# Patient Record
Sex: MALE | Race: WHITE | NOT HISPANIC OR LATINO | Employment: OTHER | URBAN - METROPOLITAN AREA
[De-identification: names, ages, dates, MRNs, and addresses within clinical notes are randomized per-mention and may not be internally consistent; named-entity substitution may affect disease eponyms.]

---

## 2017-01-04 ENCOUNTER — GENERIC CONVERSION - ENCOUNTER (OUTPATIENT)
Dept: OTHER | Facility: OTHER | Age: 77
End: 2017-01-04

## 2017-02-22 ENCOUNTER — ALLSCRIPTS OFFICE VISIT (OUTPATIENT)
Dept: OTHER | Facility: OTHER | Age: 77
End: 2017-02-22

## 2017-04-07 ENCOUNTER — GENERIC CONVERSION - ENCOUNTER (OUTPATIENT)
Dept: OTHER | Facility: OTHER | Age: 77
End: 2017-04-07

## 2017-05-18 ENCOUNTER — ALLSCRIPTS OFFICE VISIT (OUTPATIENT)
Dept: OTHER | Facility: OTHER | Age: 77
End: 2017-05-18

## 2017-05-18 DIAGNOSIS — M75.22 BICIPITAL TENDINITIS OF LEFT SHOULDER: ICD-10-CM

## 2017-05-19 ENCOUNTER — GENERIC CONVERSION - ENCOUNTER (OUTPATIENT)
Dept: OTHER | Facility: OTHER | Age: 77
End: 2017-05-19

## 2017-05-19 LAB
25(OH)D3 SERPL-MCNC: 31 NG/ML (ref 30–100)
A/G RATIO (HISTORICAL): 1.5 (CALC) (ref 1–2.5)
ALBUMIN SERPL BCP-MCNC: 4.3 G/DL (ref 3.6–5.1)
ALP SERPL-CCNC: 62 U/L (ref 40–115)
ALT SERPL W P-5'-P-CCNC: 10 U/L (ref 9–46)
AST SERPL W P-5'-P-CCNC: 22 U/L (ref 10–35)
BASOPHILS # BLD AUTO: 0.4 %
BASOPHILS # BLD AUTO: 30 CELLS/UL (ref 0–200)
BILIRUB SERPL-MCNC: 0.8 MG/DL (ref 0.2–1.2)
BILIRUB UR QL STRIP: NEGATIVE
BUN SERPL-MCNC: 22 MG/DL (ref 7–25)
BUN/CREA RATIO (HISTORICAL): ABNORMAL (CALC) (ref 6–22)
CALCIUM (ADJUSTED FOR ALBUMIN) (HISTORICAL): 9.4 MG/DL (CALC) (ref 8.6–10.2)
CALCIUM SERPL-MCNC: 9.3 MG/DL (ref 8.6–10.3)
CHLORIDE SERPL-SCNC: 103 MMOL/L (ref 98–110)
CHOLEST SERPL-MCNC: 160 MG/DL (ref 125–200)
CHOLEST/HDLC SERPL: 4 (CALC)
CO2 SERPL-SCNC: 26 MMOL/L (ref 20–31)
COLOR UR: ABNORMAL
COMMENT (HISTORICAL): CLEAR
CREAT SERPL-MCNC: 1.13 MG/DL (ref 0.7–1.18)
DEPRECATED RDW RBC AUTO: 14.6 % (ref 11–15)
EGFR AFRICAN AMERICAN (HISTORICAL): 73 ML/MIN/1.73M2
EGFR-AMERICAN CALC (HISTORICAL): 63 ML/MIN/1.73M2
EOSINOPHIL # BLD AUTO: 251 CELLS/UL (ref 15–500)
EOSINOPHIL # BLD AUTO: 3.3 %
FECAL OCCULT BLOOD DIAGNOSTIC (HISTORICAL): NEGATIVE
GAMMA GLOBULIN (HISTORICAL): 2.9 G/DL (CALC) (ref 1.9–3.7)
GLUCOSE (HISTORICAL): 103 MG/DL (ref 65–99)
GLUCOSE (HISTORICAL): NEGATIVE
HCT VFR BLD AUTO: 44.8 % (ref 38.5–50)
HDLC SERPL-MCNC: 40 MG/DL
HGB BLD-MCNC: 15.4 G/DL (ref 13.2–17.1)
KETONES UR STRIP-MCNC: ABNORMAL MG/DL
LDL CHOLESTEROL (HISTORICAL): 100 MG/DL (CALC)
LEUKOCYTE ESTERASE UR QL STRIP: NEGATIVE
LYMPHOCYTES # BLD AUTO: 1414 CELLS/UL (ref 850–3900)
LYMPHOCYTES # BLD AUTO: 18.6 %
MCH RBC QN AUTO: 30.8 PG (ref 27–33)
MCHC RBC AUTO-ENTMCNC: 34.4 G/DL (ref 32–36)
MCV RBC AUTO: 89.7 FL (ref 80–100)
MONOCYTES # BLD AUTO: 851 CELLS/UL (ref 200–950)
MONOCYTES (HISTORICAL): 11.2 %
NEUTROPHILS # BLD AUTO: 5054 CELLS/UL (ref 1500–7800)
NEUTROPHILS # BLD AUTO: 66.5 %
NITRITE UR QL STRIP: NEGATIVE
NON-HDL-CHOL (CHOL-HDL) (HISTORICAL): 120 MG/DL (CALC)
PH UR STRIP.AUTO: 6 [PH] (ref 5–8)
PLATELET # BLD AUTO: 221 THOUSAND/UL (ref 140–400)
PMV BLD AUTO: 8.7 FL (ref 7.5–12.5)
POTASSIUM SERPL-SCNC: 4.5 MMOL/L (ref 3.5–5.3)
PROT UR STRIP-MCNC: NEGATIVE MG/DL
RBC # BLD AUTO: 5 MILLION/UL (ref 4.2–5.8)
SODIUM SERPL-SCNC: 138 MMOL/L (ref 135–146)
SP GR UR STRIP.AUTO: 1.02 (ref 1–1.03)
TOTAL PROTEIN (HISTORICAL): 7.2 G/DL (ref 6.1–8.1)
TRIGL SERPL-MCNC: 102 MG/DL
TSH SERPL DL<=0.05 MIU/L-ACNC: 1.53 MIU/L (ref 0.4–4.5)
WBC # BLD AUTO: 7.6 THOUSAND/UL (ref 3.8–10.8)

## 2017-06-21 ENCOUNTER — ALLSCRIPTS OFFICE VISIT (OUTPATIENT)
Dept: OTHER | Facility: OTHER | Age: 77
End: 2017-06-21

## 2017-06-21 DIAGNOSIS — G20 PARKINSON'S DISEASE (HCC): ICD-10-CM

## 2017-07-12 ENCOUNTER — ALLSCRIPTS OFFICE VISIT (OUTPATIENT)
Dept: OTHER | Facility: OTHER | Age: 77
End: 2017-07-12

## 2017-07-12 ENCOUNTER — GENERIC CONVERSION - ENCOUNTER (OUTPATIENT)
Dept: OTHER | Facility: OTHER | Age: 77
End: 2017-07-12

## 2017-07-14 ENCOUNTER — GENERIC CONVERSION - ENCOUNTER (OUTPATIENT)
Dept: OTHER | Facility: OTHER | Age: 77
End: 2017-07-14

## 2017-08-10 ENCOUNTER — GENERIC CONVERSION - ENCOUNTER (OUTPATIENT)
Dept: OTHER | Facility: OTHER | Age: 77
End: 2017-08-10

## 2017-10-10 ENCOUNTER — GENERIC CONVERSION - ENCOUNTER (OUTPATIENT)
Dept: OTHER | Facility: OTHER | Age: 77
End: 2017-10-10

## 2017-10-13 ENCOUNTER — ALLSCRIPTS OFFICE VISIT (OUTPATIENT)
Dept: OTHER | Facility: OTHER | Age: 77
End: 2017-10-13

## 2017-10-13 LAB
BILIRUB UR QL STRIP: NORMAL
CLARITY UR: NORMAL
COLOR UR: YELLOW
GLUCOSE (HISTORICAL): NORMAL
HGB UR QL STRIP.AUTO: NORMAL
KETONES UR STRIP-MCNC: NORMAL MG/DL
LEUKOCYTE ESTERASE UR QL STRIP: NORMAL
NITRITE UR QL STRIP: NORMAL
PH UR STRIP.AUTO: 7 [PH]
PROT UR STRIP-MCNC: NORMAL MG/DL
SP GR UR STRIP.AUTO: 1.02
UROBILINOGEN UR QL STRIP.AUTO: 0.2

## 2017-10-17 ENCOUNTER — GENERIC CONVERSION - ENCOUNTER (OUTPATIENT)
Dept: OTHER | Facility: OTHER | Age: 77
End: 2017-10-17

## 2017-10-18 ENCOUNTER — ALLSCRIPTS OFFICE VISIT (OUTPATIENT)
Dept: OTHER | Facility: OTHER | Age: 77
End: 2017-10-18

## 2017-10-24 ENCOUNTER — ALLSCRIPTS OFFICE VISIT (OUTPATIENT)
Dept: OTHER | Facility: OTHER | Age: 77
End: 2017-10-24

## 2017-10-24 DIAGNOSIS — G20 PARKINSON'S DISEASE (HCC): ICD-10-CM

## 2017-11-01 NOTE — PROGRESS NOTES
Assessment  1  Parkinson's disease (332 0) (G20)    Plan  Parkinson's disease    · *1 - [facility]  *Consult for therapy to work on PD balance,  gait and freezing techniques  Status: Active  Requested for: 37VNI4040   Ordered; For: Parkinson's disease; Ordered By: Daniel Hurtado Performed:  Due: 05YBC0243  Care Summary provided  : Yes   · Follow-up visit in 4 Months Evaluation and Treatment  Follow-up with Adiel Weston or Carol   Status: Complete  Done: 94BEC9500   Ordered; For: Parkinson's disease; Ordered By: Daniel Hurtado Performed:  Due: 64JTJ8758; Last Updated By: Melvi Gómez; 10/24/2017 9:17:01 AM   · Follow-up Visit in 8 Months Evaluation and Treatment  Follow-up WITH Dr Kory Charles  Status:  Complete  Done: 94RHW2496   Ordered; For: Parkinson's disease; Ordered By: Daniel Hurtado Performed:  Due: 61JAF1733; Last Updated By: Melvi Gómez; 10/24/2017 9:17:21 AM    Discussion/Summary  Discussion Summary:   Patient overall feels he is doing well other than continued freezing episodes  His exam is stable  He remains active with exercises at home  He has improvement of his tremors with Sinemet however denies any change in his freezing  Will have him try and take Sinemet 2tabs (5am, 8am, 11am, 2pm, 5pm) consistently for a week to see if this helps  If he has side effects he can return to his current dosing  Once again also discussed the role of therapy  Made another referral and will have him go to see if there are techniques to help with his freezing  He is still driving and may consider getting a Divers Evaluation in the future  No memory complaints  He is not interested in any surgical options  the case and plan to Dr Kory Charles for review  Counseling Documentation With Imm: The patient, patient's family was counseled regarding instructions for management,-- prognosis,-- patient and family education,-- impressions,-- risks and benefits of treatment options   total time of encounter was 35 minutes-- and-- 19 minutes was spent counseling  Chief Complaint  Chief Complaint Free Text Note Form: Patient presents f/u Parkinson's disease  History of Present Illness  HPI: John Gann is a 68year old male  with prostate cancer on hormonal treatment and Parkinson's disease who presents for follow up  To review, symptoms began with right hand rest tremor and decrease in fine motor movements which progressed to include right lower extremity tremor and slowness in movements  He was diagnosed August 2010 and started on Sinemet 25/100 tid and Azilect  Previous trial of ropinirole ER was associated with side effects  Darleen Terry was not covered  his last visit he was overall doing well with some improvement of wearing off with increased Sinemet  He was having some freezing and was sent for PT  No medication changes were made  He remains on Sinemet 1 5tabs x4hdolq (5am, 8am, 11am, 2pm, 5pm) and Azilect 1mg daily  He will take 2tabs of Sinemet if he is busy and doing a lot of activity  He denies any clear improvement on the higher dose however so he does not take 2tabs everyday  is still freezing more often later in the day when he is tired  It is worse when he goes through doorways  He did not go to the therapy  He does still do exercises every day  He will stretch, ride the bike and walk on the treadmill  He does shuffle at times but denies any falls  He is able to perform all of his ADLs well  He dresses and showers without issues  He is still driving and denies any issues  His wife is always in the car with him and she feels that he is still driving well  He was recently found to elevated PSA and may need to start a new treatment  His memory is still very good  He is sleeping well  He has a good appetite and denies any issues with swallowing  He finds that his tremors improve when he takes his dose of medication  He denies any improvement of the freezing even on higher doses of Sinemet     his ROS, FH, Sh and social history  Review of Systems  Neurological ROS:   Constitutional: no fever, no chills, no recent weight gain, no recent weight loss, no complaints of feeling tired, no changes in appetite  HEENT:  no sinus problems, not feeling congested, no blurred vision, no dryness of the eyes, no eye pain, no hearing loss, no tinnitus, no mouth sores, no sore throat, no hoarseness, no dysphagia, no masses, no bleeding  Cardiovascular:  no chest pain or pressure, no palpitations present, the heart rate was not rapid or irregular, no swelling in the arms or legs, no poor circulation  Respiratory:  no unusual or persistant cough, no shortness of breath with or without exertion  Gastrointestinal:  no nausea, no vomiting, no diarrhea, no abdominal pain, no changes in bowel habits, no melena, no loss of bowel control  Genitourinary: feelings of urinary urgency  Musculoskeletal: arthralgias-- and-- myalgias  Integumentary  no masses, no rash, no skin lesions, no livedo reticularis  Psychiatric:  no anxiety, no depression, no mood swings, no psychiatric hospitalizations, no sleep problems  Endocrine loss of sexual ability or drive   Hematologic/Lymphatic:  no unusual bleeding, no tendency for easy bruising, no clotting skin or lumps  Neurological General: waking up at night  Neurological Mental Status:  no confusion, no mood swings, no alteration or loss of consciousness, no difficulty expressing/understanding speech, no memory problems  Neurological Cranial Nerves:  no blurry or double vision, no loss of vision, no face drooping, no facial numbness or weakness, no taste or smell loss/changes, no hearing loss or ringing, no vertigo or dizziness, no dysphagia, no slurred speech  Neurological Motor findings include:  no tremor, no twitching, no cramping(pre/post exercise), no atrophy  Neurological Coordination: clumsiness     Neurological Sensory:  no numbness, no pain, no tingling, does not fall when eyes closed or taking a shower  Neurological Gait:  no difficulty walking, not falling to one side, no sensation of being pushed, has not had falls  Active Problems  1  Arteriosclerotic coronary artery disease (414 00) (I25 10)   2  Benign hypertrophy of prostate (600 00) (N40 0)   3  Biceps tendinitis of left shoulder (726 12) (M75 22)   4  CA of prostate (185) (C61)   5  Chronic low back pain (724 2,338 29) (M54 5,G89 29)   6  Contusion With Intact Skin Surface Of The Right Middle Fingernail (923 3)   7  Depression (311) (F32 9)   8  Flu vaccine need (V04 81) (Z23)   9  Gastroenteritis (558 9) (K52 9)   10  GERD without esophagitis (530 81) (K21 9)   11  Hearing Loss (389 9)   12  Hematuria (599 70) (R31 9)   13  Hypercholesterolemia (272 0) (E78 00)   14  Hypertension (401 9) (I10)   15  Infection of nose (478 19) (J34 89)   16  Myocardial Infarction Arrhythmias (427 9)   17  Parkinson's disease (332 0) (G20)   18  Prostate cancer (185) (C61)   19  Prostatitis (601 9) (N41 9)   20  Unstable angina (411 1) (I20 0)    Surgical History  1  History of Needle Biopsy Of Prostate    Family History  Mother    1  No pertinent family history  Father    2  Family history of Prostate cancer  Brother    3  Family history of myocardial infarction (V17 3) (Z82 49)  Family History    4  Family history of Brother  At Age 43   8  Family history of Father  At Age 80    Social History   · Does not use illicit drugs (I86 62) (Z78 9)   · Never a smoker   · Never A Smoker   · Occasional alcohol use    Current Meds   1  Aspirin 81 MG CAPS; TAKE 2 CAPSULE Daily; Therapy: (Recorded:72Obe9418) to Recorded   2  Atorvastatin Calcium 20 MG Oral Tablet; Take 1 tablet daily; Therapy: (Recorded:06Edp8418) to Recorded   3  Bicalutamide 50 MG Oral Tablet; TAKE 1 TABLET DAILY; Last Rx:2017 Ordered   4  Carbidopa-Levodopa  MG Oral Tablet; TAKE 2 TABLETS BY MOUTH 3 TIMES A   DAY;    Therapy: 2011 to (Domingo Coffey)  Requested for: 73ABV9435; Last   TF:58HMB1637 Ordered   5  Clopidogrel Bisulfate 75 MG Oral Tablet; Take 1 tablet daily Recorded   6  Lupron Depot (6-Month) KIT; INJECT EVERY 6 MONTHS; Therapy: (Recorded:84Yda5742) to Recorded   7  Metoprolol Tartrate 25 MG Oral Tablet; TAKE 1 TABLET TWICE DAILY; Therapy: (9640 1055) to Recorded   8  Rasagiline Mesylate 1 MG Oral Tablet; Take 1 tablet daily; Therapy: 57WFC3836 to (Evaluate:91Chs2889)  Requested for: 60Bxu2351; Last   Rx:09Cbo8229 Ordered    Allergies  1  No Known Drug Allergies    Vitals  Signs   Recorded: 39IOA9103 08:30AM   Heart Rate: 77  Weight: 181 lb 4 oz  BMI Calculated: 25 28  BSA Calculated: 2 02  Recorded: 24Oct2017 08:29AM   Heart Rate: 76  Systolic: 070, LUE, Sitting  Diastolic: 79, LUE, Sitting  Height: 5 ft 11 in  Weight: 226 lb 7 oz  BMI Calculated: 31 58  BSA Calculated: 2 22    Physical Exam    Constitutional   General appearance: Abnormal  -- (Moderate hypomimia 2 , Mild to moderate hypophonia 2)   Musculoskeletal   Gait and station: Abnormal  -- (Arose without using hands on second attempt  Posture slightly stooped with right tilt  Good stride and arm swing  Wide based gait  Slight freezing on turn  Reemergent resting tremor on the right with ambulation, tendency to hold the right hand slightly turned when walking  )   Muscle strength: Normal strength throughout  Muscle tone: No atrophy, abnormal movements, flaccidity, cogwheeling or spasticity  -- (No rigidity noted  )   Involuntary movements: Abnormal involuntary movements were observed  -- (Finger to nose was normal  An intermittent mild amplitude rest tremor is present in the right upper extremity which disappears with action and reemerges 1,0  There is no action tremor  Mild bradykinesia right finger taps 1,0 , handgrips 1,0 rapid alternating movements 1,1  Normal heel and toe taps   There is no dystonia, or dyskinesia)   Neurologic   Orientation to person, place, and time: Normal     Recent and remote memory: Demonstrates normal memory  Attention span and concentration: Normal thought process and attention span  Language: Names objects, able to repeat phrases and speaks spontaneously  -- ( [address] [place] [address], Ne 27/30 6/10/15)-- MOCA 30/30 - 6/21/17  Fund of knowledge: Normal vocabulary with appropriate knowledge of current events and past history  2nd cranial nerve: Normal     3rd, 4th, and 6th cranial nerves: Normal     5th cranial nerve: Normal     7th cranial nerve: Normal     8th cranial nerve: Normal     9th cranial nerve: Normal     11th cranial nerve: Normal     12th cranial nerve: Normal     Reflexes: Normal  -- (Positive glabellar)   Coordination: Normal        Attending Note  Collaborating Physician Note: Collaborating Note: I agree with the Advanced Practitioner note   I discussed the case with the Advanced Practitioner and reviewed the AP note      Future Appointments    Date/Time Provider Specialty Site   11/30/2017 08:00 AM Irasema Chanel, [address] [place] [address]   01/17/2018 09:15 AM Manjinder Oneal MD Urology [address]   02/08/2018 08:00 AM Luba Olszewski, [facility] Neurology [facility]     Signatures   Electronically signed by : Gene Siddiqi [facility]; Oct 24 2017  9:13AM EST                       (Author)    Electronically signed by : Gina Silveira MD; Oct 31 2017  5:00AM EST                       (Author)

## 2017-11-07 ENCOUNTER — GENERIC CONVERSION - ENCOUNTER (OUTPATIENT)
Dept: OTHER | Facility: OTHER | Age: 77
End: 2017-11-07

## 2017-11-30 ENCOUNTER — ALLSCRIPTS OFFICE VISIT (OUTPATIENT)
Dept: OTHER | Facility: OTHER | Age: 77
End: 2017-11-30

## 2017-12-01 ENCOUNTER — GENERIC CONVERSION - ENCOUNTER (OUTPATIENT)
Dept: OTHER | Facility: OTHER | Age: 77
End: 2017-12-01

## 2017-12-01 ENCOUNTER — LAB CONVERSION - ENCOUNTER (OUTPATIENT)
Dept: OTHER | Facility: OTHER | Age: 77
End: 2017-12-01

## 2017-12-01 LAB
A/G RATIO (HISTORICAL): 1.6 (CALC) (ref 1–2.5)
ALBUMIN SERPL BCP-MCNC: 4.3 G/DL (ref 3.6–5.1)
ALP SERPL-CCNC: 68 U/L (ref 40–115)
ALT SERPL W P-5'-P-CCNC: 13 U/L (ref 9–46)
AST SERPL W P-5'-P-CCNC: 18 U/L (ref 10–35)
BILIRUB SERPL-MCNC: 0.8 MG/DL (ref 0.2–1.2)
BUN SERPL-MCNC: 19 MG/DL (ref 7–25)
BUN/CREA RATIO (HISTORICAL): ABNORMAL (CALC) (ref 6–22)
CALCIUM (ADJUSTED FOR ALBUMIN) (HISTORICAL): 9.7 MG/DL (CALC) (ref 8.6–10.2)
CALCIUM SERPL-MCNC: 9.6 MG/DL (ref 8.6–10.3)
CHLORIDE SERPL-SCNC: 103 MMOL/L (ref 98–110)
CHOLEST SERPL-MCNC: 127 MG/DL
CHOLEST/HDLC SERPL: 3.5 (CALC)
CO2 SERPL-SCNC: 26 MMOL/L (ref 20–31)
CREAT SERPL-MCNC: 1.04 MG/DL (ref 0.7–1.18)
EGFR AFRICAN AMERICAN (HISTORICAL): 80 ML/MIN/1.73M2
EGFR-AMERICAN CALC (HISTORICAL): 69 ML/MIN/1.73M2
GAMMA GLOBULIN (HISTORICAL): 2.7 G/DL (CALC) (ref 1.9–3.7)
GLUCOSE (HISTORICAL): 100 MG/DL (ref 65–99)
HDLC SERPL-MCNC: 36 MG/DL
LDL CHOLESTEROL (HISTORICAL): 75 MG/DL (CALC)
NON-HDL-CHOL (CHOL-HDL) (HISTORICAL): 91 MG/DL (CALC)
POTASSIUM SERPL-SCNC: 4.1 MMOL/L (ref 3.5–5.3)
SODIUM SERPL-SCNC: 139 MMOL/L (ref 135–146)
TOTAL PROTEIN (HISTORICAL): 7 G/DL (ref 6.1–8.1)
TRIGL SERPL-MCNC: 78 MG/DL

## 2017-12-05 NOTE — PROGRESS NOTES
Assessment    1  Arteriosclerotic coronary artery disease (414 00) (I25 10)   2  Hypercholesterolemia (272 0) (E78 00)   3  Hypertension (401 9) (I10)   4  GERD without esophagitis (530 81) (K21 9)   5  Biceps tendinitis of left shoulder (726 12) (M75 22)   6  Parkinson's disease (332 0) (G20)   7  Prostate cancer (80) (C61)    Plan  Arteriosclerotic coronary artery disease, Hypercholesterolemia, Hypertension    · (Q) COMPREHENSIVE METABOLIC PNL W/ADJUSTED CALCIUM; Status:Active; Requested for:30Nov2017;    · (Q) LIPID PANEL WITH REFLEX TO DIRECT LDL; Status:Active; Requested  for:30Nov2017;   Biceps tendinitis of left shoulder    · *1 -  [facility] )  Co-Management  *  Status: Active  Requested for: 53XAM4024  Care Summary provided  : Yes  Health Maintenance    · *VB - Fall Risk Assessment  (Dx Z13 89 Screen for Neurologic Disorder); Status:Active; Requested for:30Nov2017;    · *VB - Urinary Incontinence Screen (Dx Z13 89 Screen for UI); Status:Active; Requested  for:30Nov2017;   Hypercholesterolemia    · A diet that is low in fat, cholesterol, and sodium is considered a cardiac diet ;  Status:Complete;   Done: 98IPW3258   · Begin a limited exercise program ; Status:Complete;   Done: 96TXT6393   · Begin or continue regular aerobic exercise  Gradually work up to at least 3 sessions of  30 minutes of exercise a week ; Status:Complete;   Done: 87RNP9994   · Continue with our present treatment plan ; Status:Complete;   Done: 48CYD2577   · Eat a low fat and low cholesterol diet ; Status:Complete;   Done: 56HLZ6978   · Eat no more than 30 grams of fat per day ; Status:Complete;   Done: 67BHK2565   · We recommend you modify your diet to achieve and maintain a healthy weight  Being  overweight may increase your risk for developing health problems such as diabetes,  heart disease, and cancer  Avoid high fat foods and eat a balanced diet rich  in fruits and vegetables   The combination of a reduced-calorie diet and increased  physical activity is recommended  Please let us know if you would like to  learn more about your nutrition and calorie needs, and additional options including  weight loss programs that can help you achieve your goals ; Status:Complete;   Done:  62EZG9484  Hypertension    · Call [phone] if: Your blood pressure is frequently higher than 140/90 ;  Status:Complete;   Done: 06PKU3302   · Restrict the salt in your diet by avoiding highly salted foods ; Status:Complete;   Done:  31CRX5652   · Restrict your sodium (salt) intake to 2 grams per day ; Status:Complete;   Done:  56IRV4280   · Take your blood pressure twice a week  Record the numbers and bring them with you to  your appointments ; Status:Complete;   Done: 66HOS6613   · We encourage you to begin to make lifestyle changes to help control your blood  pressure  These may include losing weight, increasing your activity level, limiting salt in  your diet, decreasing alcohol intake, and eating a diet low in fat and rich in fruits  and vegetables ; Status:Complete;   Done: 96SZT6244    Discussion/Summary    Fasting labs drawn for CMP and lipid profile  Patient is being referred to [address] [place] [address] for further evaluation and treatment of left shoulder pain  Patient to continue present treatment  Patient instructed to follow a low-fat and a low-salt diet and continue regular exercise 150 minutes per week  Patient to follow up with the specialist as scheduled and return to the office in 6 months  Possible side effects of new medications were reviewed with the patient/guardian today  The treatment plan was reviewed with the patient/guardian  The patient/guardian understands and agrees with the treatment plan      Chief Complaint  Fasting  quest   Patient is here today for follow up of chronic conditions described in HPI        History of Present Illness  Patient is here for routine appointment for chronic conditions and fasting labs  Patient has been feeling well overall although complains of continued left shoulder pain  Patient has been exercising on his treadmill and stationary bike for 20-25 minutes 7 days a week  The patient states he has been doing well with his coronary artery disease symptoms since the last visit  Comorbid Illnesses: hypertension  Complications: MI  He has no significant interval events  Symptoms: denies chest pain when at rest, denies exertional chest pain, denies dyspnea, stable fatigue and stable exercise intolerance  Associated symptoms include no syncope, no palpitations, no PND, no orthopnea and no edema  His symptoms do not limit his activities  He denies nitroglycerin use since the last visit  Medications: the patient is adherent with his medication regimen  He denies medication side effects  The patient is being seen for follow-up of gastroesophageal reflux disease and occas TUMS  The patient reports doing well  He has had no significant interval events  Interval symptoms:  stable heartburn, denies chest pain, denies abdominal pain, denies acid regurgitation and denies dysphagia  Associated symptoms: no hoarseness, no cough, no nausea, no vomiting, no hematemesis, no melena and no weight loss  The patient is not currently on medication for this problem  Disease management:  the patient is doing well with his goals  The patient states his hyperlipidemia has been under good control since the last visit  Comorbid Illnesses: coronary artery disease and hypertension  He has no significant interval events  Symptoms: denies muscle pain and denies muscle weakness  Associated symptoms include no memory loss  Medications: the patient is adherent with his medication regimen  He denies medication side effects  The patient is doing well with his hyperlipidemia goals  the patient's last LDL was 100 mg/dL   The patient is due for a lipid panel and liver function tests    The patient presents for follow-up of essential hypertension  The patient states he has been stable with his blood pressure control since the last visit  Comorbid Illnesses: coronary artery disease  He has no significant interval events  Symptoms: denies impaired vision, denies dyspnea, denies chest pain, denies intermittent leg claudication and denies lower extremity edema  Associated symptoms include no headache  Home monitoring: The patient is not checking blood pressure at home  Medications: the patient is adherent with his medication regimen  He denies medication side effects  Disease Management: the patient is doing well with his blood pressure goals  Review of Systems    Constitutional: no fever, not feeling poorly and no chills    The patient presents with complaints of occasional episodes of feeling tired  Eyes: No complaints of eye pain, no red eyes, no discharge from eyes, no itchy eyes  ENT: no complaints of earache, no hearing loss, no nosebleeds, no nasal discharge, no sore throat, no hoarseness  Genitourinary: nocturia and 3x, but no dysuria, no urinary hesitancy and no incontinence  Hematologic/Lymphatic: No complaints of swollen glands, no swollen glands in the neck, does not bleed easily, no easy bruising  Preventive Quality 65 and Older: Falls Risk: The patient fell 0 times in the past 12 months  Symptoms Include: impaired balance, but no confusion, no lightheadedness, no vertigo, no dizziness, no syncope, no visual problems and no leg weakness  The patient is currently experiencing fall symptoms  Associated symptoms:  impaired mobility, but no impaired ability to live independently  The patient is currently experiencing urinary symptoms   Urinary Incontinence Symptoms includes: urinary urgency, nocturia and post-void dribbling, but no urinary incontinence, no incomplete bladder emptying, no urinary frequency, no urinary hesitancy, no dysuria, no straining, no weak stream and no intermittent stream       Active Problems    1  Arteriosclerotic coronary artery disease (414 00) (I25 10)   2  Benign hypertrophy of prostate (600 00) (N40 0)   3  Biceps tendinitis of left shoulder (726 12) (M75 22)   4  CA of prostate (185) (C61)   5  Chronic low back pain (724 2,338 29) (M54 5,G89 29)   6  Contusion With Intact Skin Surface Of The Right Middle Fingernail (923 3)   7  Depression (311) (F32 9)   8  Flu vaccine need (V04 81) (Z23)   9  Gastroenteritis (558 9) (K52 9)   10  GERD without esophagitis (530 81) (K21 9)   11  Hearing Loss (389 9)   12  Hematuria (599 70) (R31 9)   13  Hypercholesterolemia (272 0) (E78 00)   14  Hypertension (401 9) (I10)   15  Myocardial Infarction Arrhythmias (427 9)   16  Parkinson's disease (332 0) (G20)   17  Prostate cancer (185) (C61)   18  Prostatitis (601 9) (N41 9)   19  Unstable angina (411 1) (I20 0)    Surgical History    1  History of Needle Biopsy Of Prostate    Family History  Mother    1  No pertinent family history  Father    2  Family history of Prostate cancer  Brother    3  Family history of myocardial infarction (V17 3) (Z82 49)  Family History    4  Family history of Brother  At Age 43   8  Family history of Father  At Age 80    Social History    · Does not use illicit drugs (R15 97) (Z78 9)   · Never a smoker   · Never A Smoker   · Occasional alcohol use    Current Meds   1  Aspirin 81 MG CAPS; TAKE 2 CAPSULE Daily; Therapy: (Recorded:53Wxu1156) to Recorded   2  Atorvastatin Calcium 20 MG Oral Tablet; Take 1 tablet daily; Therapy: (Recorded:75Hvb5701) to Recorded   3  Carbidopa-Levodopa  MG Oral Tablet; TAKE 2 TABLETS BY MOUTH 3 TIMES A   DAY; Therapy: 16HFX3679 to (Manjinder Canela)  Requested for: 18GFV9707; Last   Rx:2017 Ordered   4  Clopidogrel Bisulfate 75 MG Oral Tablet; Take 1 tablet daily Recorded   5  Lupron Depot (6-Month) KIT; INJECT EVERY 6 MONTHS;    Therapy: (Recorded:95Pes2540) to Recorded   6  Metoprolol Tartrate 25 MG Oral Tablet; TAKE 1 TABLET TWICE DAILY; Therapy: (Haig Fill) to Recorded   7  PredniSONE 2 5 MG Oral Tablet; Take 1 tablet twice daily; Therapy: (Recorded:30Nov2017) to Recorded   8  Rasagiline Mesylate 1 MG Oral Tablet; Take 1 tablet daily; Therapy: 00ETE7281 to (Evaluate:54Zca1957)  Requested for: 69Zfv7316; Last   Rx:55Zwg8111 Ordered   9  Zytiga 500 MG Oral Tablet; Take 1 tablet daily; Therapy: (Recorded:30Nov2017) to Recorded    Allergies    1  No Known Drug Allergies    Vitals  Vital Signs    Recorded: 51RKC3208 08:30AM Recorded: 37VNA2359 08:04AM   Temperature  97 7 F   Heart Rate 64    Respiration 16    Systolic 054    Diastolic 84    BP CUFF SIZE Large    Height  5 ft 11 in   Weight  227 lb    BMI Calculated  31 66   BSA Calculated  2 23     Physical Exam    Constitutional   General appearance: No acute distress, well appearing and well nourished  Eyes   Conjunctiva and lids: No swelling, erythema, or discharge  Ears, Nose, Mouth, and Throat   External inspection of ears and nose: Normal     Otoscopic examination: Tympanic membrance translucent with normal light reflex  Canals patent without erythema  Nasal mucosa, septum, and turbinates: Normal without edema or erythema  Oropharynx: Normal with no erythema, edema, exudate or lesions  Pulmonary   Respiratory effort: No increased work of breathing or signs of respiratory distress  Auscultation of lungs: Clear to auscultation, equal breath sounds bilaterally, no wheezes, no rales, no rhonci  Cardiovascular   Auscultation of heart: Normal rate and rhythm, normal S1 and S2, without murmurs  Examination of extremities for edema and/or varicosities: Normal     Carotid pulses: Normal     Abdomen   Abdomen: Non-tender, no masses  Lymphatic   Palpation of lymph nodes in neck: No lymphadenopathy      Musculoskeletal   Gait and station: Abnormal     Inspection/palpation of joints, bones, and muscles: Abnormal   Left shoulder range of motion slightly decreased  Positive tenderness over anterior lateral aspect  Skin   Skin and subcutaneous tissue: Normal without rashes or lesions      Psychiatric   Orientation to person, place and time: Normal     Mood and affect: Normal          Future Appointments    Date/Time Provider Specialty Site   01/17/2018 09:15 AM Brad Randall MD Urology [address]   02/08/2018 08:00 AM Arnav Garcia [facility] [address] [facility]     Signatures   Electronically signed by : Abbie Man DO; Nov 30 2017  8:43AM EST                       (Author)

## 2017-12-11 ENCOUNTER — APPOINTMENT (OUTPATIENT)
Dept: RADIOLOGY | Facility: OTHER | Age: 77
End: 2017-12-11
Payer: COMMERCIAL

## 2017-12-11 ENCOUNTER — ALLSCRIPTS OFFICE VISIT (OUTPATIENT)
Dept: OTHER | Facility: OTHER | Age: 77
End: 2017-12-11

## 2017-12-11 DIAGNOSIS — M25.512 PAIN IN LEFT SHOULDER: ICD-10-CM

## 2017-12-11 DIAGNOSIS — M75.32 CALCIFIC TENDINITIS OF LEFT SHOULDER: ICD-10-CM

## 2017-12-11 DIAGNOSIS — M75.22 BICIPITAL TENDINITIS OF LEFT SHOULDER: ICD-10-CM

## 2017-12-11 PROCEDURE — 73030 X-RAY EXAM OF SHOULDER: CPT

## 2017-12-11 PROCEDURE — 73060 X-RAY EXAM OF HUMERUS: CPT

## 2017-12-12 ENCOUNTER — GENERIC CONVERSION - ENCOUNTER (OUTPATIENT)
Dept: FAMILY MEDICINE CLINIC | Facility: CLINIC | Age: 77
End: 2017-12-12

## 2017-12-12 NOTE — PROGRESS NOTES
Assessment    1  Calcific tendinitis of left shoulder (726 11) (M75 32)   2  Biceps tendinitis of left shoulder (726 12) (M75 22)   3  Left shoulder pain (719 41) (M25 512)    Plan  Biceps tendinitis of left shoulder, Calcific tendinitis of left shoulder, Left shoulder pain    · *1 - [facility]  *  Status: Active  Requested for: 10DKY2723  Care Summary provided  : Yes  Left shoulder pain    · * XR HUMERUS LEFT; Status:Active - Retrospective Authorization; Requestedfor:83Fur8847;    · * XR SHOULDER 2+ VIEW LEFT; Status:Active - Retrospective By Protocol Authorization; Requested for:17Dgp0982;     Discussion/Summary  Patient discussion: discussed with the patient, 45 minute visit, greater than half of the time was spent on counseling  Had a detailed discussion with Mr Mylene Herrmann with regards to his left shoulder and arm pain  His likely getting a referred pain to his left arm and indicated good relief of his left shoulder and arm pain with improved range of motion following left subacromial cortisone injection on today's visit  I will also advised him to do a course of physical therapy rehabilitation and follow him up in about 2 months time for clinical reassessment in this regard  Chief Complaint    1  Arm Pain  Left shoulder/arm pain      History of Present Illness  Mr Mylene Herrmann is a pleasant 49-year-old right-hand-dominant man who is here today for history of left anterior arm pain and some shoulder pain for approximately 9 months duration  Denies any history of trauma prior to the onset of his symptoms  Describes this as an aching pain mostly located on the left anterior arm radiating down to his elbow  Made worse with left shoulder abduction as well as internal rotation  Denies any significant associated neck pain  Denies any new tinea or numbness of the left upper extremity  is significant for prostatic cancer with bony metastasis as well as a history of Parkinson's disease   His currently on chemotherapy and reports that he has received a left shoulder injection in the past for the pain but this did not provide him significant relief  Review of Systems   Constitutional: No fever or chills, feels well, no tiredness, no recent weight loss or weight gain  Eyes: No complaints of red eyes, no eyesight problems  ENT: no complaints of loss of hearing, no nosebleeds, no sore throat  Cardiovascular: No complaints of chest pain, no palpitations, no leg claudication or lower extremity edema  Respiratory: No complaints of shortness of breath, no wheezing, no cough  Gastrointestinal: No complaints of abdominal pain, no constipation, no nausea or vomiting, no diarrhea or bloody stools  Genitourinary: No complaints of dysuria or incontinence, no hesitancy, no nocturia  Musculoskeletal: as noted in HPI  Integumentary: No complaints of skin rash or lesion, no itching or dry skin, no skin wounds  Neurological: No complaints of headache, no confusion, no numbness or tingling, no dizziness  Psychiatric: No suicidal thoughts, no anxiety, no depression  Endocrine: frequent urination  Active Problems  1  Arteriosclerotic coronary artery disease (414 00) (I25 10)   2  Benign hypertrophy of prostate (600 00) (N40 0)   3  Biceps tendinitis of left shoulder (726 12) (M75 22)   4  CA of prostate (185) (C61)   5  Calcific tendinitis of left shoulder (726 11) (M75 32)   6  Chronic low back pain (724 2,338 29) (M54 5,G89 29)   7  Contusion With Intact Skin Surface Of The Right Middle Fingernail (923 3)   8  Depression (311) (F32 9)   9  Flu vaccine need (V04 81) (Z23)   10  Gastroenteritis (558 9) (K52 9)   11  GERD without esophagitis (530 81) (K21 9)   12  Hearing Loss (389 9)   13  Hematuria (599 70) (R31 9)   14  Hypercholesterolemia (272 0) (E78 00)   15  Hypertension (401 9) (I10)   16  Left shoulder pain (719 41) (M25 512)   17  Myocardial Infarction Arrhythmias (427 9)   18   Parkinson's disease (332 0) (Debi Flow)   19  Prostate cancer (185) (C61)   20  Prostatitis (601 9) (N41 9)   21  Unstable angina (411 1) (I20 0)    Past Medical History    The active problems and past medical history were reviewed and updated today  Surgical History   · History of Needle Biopsy Of Prostate    The surgical history was reviewed and updated today  Family History  Mother    · No pertinent family history  Father    · Family history of Prostate cancer  Brother    · Family history of myocardial infarction (V17 3) (Z82 49)  Family History    · Family history of Brother  At Age 39   · Family history of Father  At Age 80    The family history was reviewed and updated today  Social History     · Does not use illicit drugs (F51 16) (Z78 9)   · Never a smoker   · Never A Smoker   · Occasional alcohol use  The social history was reviewed and updated today  The social history was reviewed and is unchanged  Current Meds   1  Aspirin 81 MG CAPS; TAKE 2 CAPSULE Daily; Therapy: (Recorded:30Epj3573) to Recorded   2  Atorvastatin Calcium 20 MG Oral Tablet; Take 1 tablet daily; Therapy: (Recorded:44Qxr4642) to Recorded   3  Carbidopa-Levodopa  MG Oral Tablet; TAKE 2 TABLETS BY MOUTH 3 TIMES A DAY; Therapy: 09JQG5748 to (Hali Narvaez)  Requested for: 42XPG7052; Last Rx:2017 Ordered   4  Clopidogrel Bisulfate 75 MG Oral Tablet; Take 1 tablet daily Recorded   5  Lupron Depot (6-Month) KIT; INJECT EVERY 6 MONTHS; Therapy: (Recorded:82Vrc6246) to Recorded   6  Metoprolol Tartrate 25 MG Oral Tablet; TAKE 1 TABLET TWICE DAILY; Therapy: (9640 1055) to Recorded   7  PredniSONE 2 5 MG Oral Tablet; Take 1 tablet twice daily; Therapy: (Recorded:2017) to Recorded   8  Rasagiline Mesylate 1 MG Oral Tablet (Azilect); Take 1 tablet daily; Therapy: 07MUF1740 to (Evaluate:69Dvt2677)  Requested for: 27Jhr2652; Last Rx:17Ljw4826 Ordered   9  Zytiga 500 MG Oral Tablet;  Take 1 tablet daily; Therapy: (Recorded:30Nov2017) to Recorded    The medication list was reviewed and updated today  Allergies  1  No Known Drug Allergies    Vitals   Recorded: 29AIN6603 11:09AM   Heart Rate 72   Systolic 345   Diastolic 71   Height 5 ft 11 in   Weight 225 lb    BMI Calculated 31 38   BSA Calculated 2 22       Physical Exam  Resting tremors present of bilateral upper extremity   subacromial bursa supraspinatus muscle Forward flexion: painful restricted AROM 140Â° degrees  Extension: painful  Abduction: painful restricted AROM 90Â° degrees  Adduction: painless  Internal rotation: painful  External rotation: painless  Motor: 4/5 extension,-- 4/5 abduction,-- 4/5 internal rotation,-- 4/5 external rotation,-- 5/5 forward flexion-- and-- 5/5 adduction  Special Tests: equivocal Cedeno test,-- positive Neer test,-- positive Empty Can test,-- equivocal Cano's test-- and-- equivocal belly press test, but-- negative Anterior Apprehension test,-- negative Yergason's test,-- negative Speed's test-- and-- negative Cross Body Adduction test  Internal rotation by spine level: painful restricted AROM L3-L4 level degrees  Left Shoulder Appearance[de-identified] Normal   Constitutional - General appearance: Normal   Neurologic - Cranial nerves: Normal -- Sensation: Normal   Psychiatric - Orientation to person, place, and time: Normal -- Mood and affect: Normal   Eyes  Conjunctiva and lids: Normal    Pupils and irises: Normal        Results/Data  I personally reviewed the films/images/results in the office today  My interpretation follows  X-ray Review Plain radiograph of the left shoulder revealed calcification at the insertion of rotator cuff proximal to the greater tuberosity indicative of calcific tendinitis  Left humerus radiograph did not reveal any significant osseous abnormality  Procedure    Procedure: Injection of the left subacromial bursa  Indication:  inflammation,-- joint pain-- and-- diagnostic   Potential complications include bleeding,-- infection-- and-- allergic reaction  Risk, benefits and alternatives were discussed with the patient  Verbal consent was obtained prior to the procedure  Alcohol, betadine and Chlorhexidine Gluconate  was used to prep the area  ethyl chloride spray was used as a topical anesthetic  Using sterile technique, the aspiration/injection needle was then directed from a lateral aspect  A 22-gauge and 1 5 inch needle was used to inject 4 mL of 1% Lidocaine-- and-- 1 mL of 40mg/mL triamcinolone  A bandage was applied  the patient tolerated the procedure well  Complications: none  Follow-up in the office in 2 month(s)  Future Appointments    Date/Time Provider Specialty Site   06/04/2018 08:00 AM Kelsea Alfonso DO [facility] [address]   01/17/2018 09:15 AM Courtney Gonsalves MD Urology [address]   02/08/2018 08:00 [facility]  Vandana Ndiaye   02/12/2018 09:00 AM ELMA Piña  Orthopedic Surgery [facility]       Signatures   Electronically signed by :  ELMA Montes De Oca ; Dec 11 2017  1:10PM EST                       (Author)

## 2017-12-15 ENCOUNTER — GENERIC CONVERSION - ENCOUNTER (OUTPATIENT)
Dept: OBGYN CLINIC | Facility: OTHER | Age: 77
End: 2017-12-15

## 2017-12-15 ENCOUNTER — APPOINTMENT (OUTPATIENT)
Dept: PHYSICAL THERAPY | Facility: REHABILITATION | Age: 77
End: 2017-12-15
Payer: COMMERCIAL

## 2017-12-15 PROCEDURE — 97161 PT EVAL LOW COMPLEX 20 MIN: CPT

## 2017-12-15 PROCEDURE — G8979 MOBILITY GOAL STATUS: HCPCS

## 2017-12-15 PROCEDURE — 97110 THERAPEUTIC EXERCISES: CPT

## 2017-12-15 PROCEDURE — G8978 MOBILITY CURRENT STATUS: HCPCS

## 2017-12-18 ENCOUNTER — GENERIC CONVERSION - ENCOUNTER (OUTPATIENT)
Dept: OTHER | Facility: OTHER | Age: 77
End: 2017-12-18

## 2017-12-19 ENCOUNTER — APPOINTMENT (OUTPATIENT)
Dept: PHYSICAL THERAPY | Facility: REHABILITATION | Age: 77
End: 2017-12-19
Payer: COMMERCIAL

## 2017-12-19 ENCOUNTER — TRANSCRIBE ORDERS (OUTPATIENT)
Dept: ADMINISTRATIVE | Facility: HOSPITAL | Age: 77
End: 2017-12-19

## 2017-12-19 DIAGNOSIS — R91.8 LUNG MASS: Primary | ICD-10-CM

## 2017-12-19 PROCEDURE — 97110 THERAPEUTIC EXERCISES: CPT

## 2017-12-19 PROCEDURE — 97035 APP MDLTY 1+ULTRASOUND EA 15: CPT

## 2017-12-20 ENCOUNTER — GENERIC CONVERSION - ENCOUNTER (OUTPATIENT)
Dept: OTHER | Facility: OTHER | Age: 77
End: 2017-12-20

## 2017-12-20 ENCOUNTER — HOSPITAL ENCOUNTER (OUTPATIENT)
Dept: CT IMAGING | Facility: HOSPITAL | Age: 77
Discharge: HOME | End: 2017-12-20
Payer: COMMERCIAL

## 2017-12-20 DIAGNOSIS — R91.8 LUNG MASS: ICD-10-CM

## 2017-12-20 PROCEDURE — 71250 CT THORAX DX C-: CPT

## 2017-12-21 ENCOUNTER — APPOINTMENT (OUTPATIENT)
Dept: PHYSICAL THERAPY | Facility: REHABILITATION | Age: 77
End: 2017-12-21
Payer: COMMERCIAL

## 2017-12-22 ENCOUNTER — APPOINTMENT (OUTPATIENT)
Dept: PHYSICAL THERAPY | Facility: REHABILITATION | Age: 77
End: 2017-12-22
Payer: COMMERCIAL

## 2017-12-22 PROCEDURE — 97035 APP MDLTY 1+ULTRASOUND EA 15: CPT

## 2017-12-22 PROCEDURE — 97110 THERAPEUTIC EXERCISES: CPT

## 2017-12-27 ENCOUNTER — APPOINTMENT (OUTPATIENT)
Dept: PHYSICAL THERAPY | Facility: REHABILITATION | Age: 77
End: 2017-12-27
Payer: COMMERCIAL

## 2017-12-27 ENCOUNTER — GENERIC CONVERSION - ENCOUNTER (OUTPATIENT)
Dept: OTHER | Facility: OTHER | Age: 77
End: 2017-12-27

## 2017-12-27 PROCEDURE — 97035 APP MDLTY 1+ULTRASOUND EA 15: CPT

## 2017-12-27 PROCEDURE — 97140 MANUAL THERAPY 1/> REGIONS: CPT

## 2017-12-27 PROCEDURE — 97110 THERAPEUTIC EXERCISES: CPT

## 2018-01-03 ENCOUNTER — APPOINTMENT (OUTPATIENT)
Dept: PHYSICAL THERAPY | Facility: REHABILITATION | Age: 78
End: 2018-01-03
Payer: COMMERCIAL

## 2018-01-03 PROCEDURE — G8982 BODY POS GOAL STATUS: HCPCS | Performed by: PHYSICAL THERAPIST

## 2018-01-03 PROCEDURE — 97035 APP MDLTY 1+ULTRASOUND EA 15: CPT

## 2018-01-03 PROCEDURE — 97110 THERAPEUTIC EXERCISES: CPT

## 2018-01-03 PROCEDURE — G8983 BODY POS D/C STATUS: HCPCS | Performed by: PHYSICAL THERAPIST

## 2018-01-09 NOTE — MISCELLANEOUS
Message   Recorded as Task   Date: 10/10/2017 10:13 AM, Created By: Violet Lemus   Task Name: Call Back   Assigned To: Aydin URIBE,TEAM   Regarding Patient: YURY FELICIANO, Status: In Progress   Comment:    Rosalba Rosas - 10 Oct 2017 10:13 AM     TASK CREATED  Caller: Self; [phone] (Home); [phone] (Work)  Pt questioning if blood work needed prior to 10/13/17 office visit w/isidro Henning - 10 Oct 2017 11:37 AM     TASK IN PROGRESS   Adrian Henning - 10 Oct 2017 11:38 AM     TASK EDITED  PT  WILL CALL BACK WITH LOCATION TO FAX PSA ORDER  Jovanna Arnold - 10 Oct 2017 3:30 PM     TASK EDITED  LMOM TO CALL BACK TO LET US KNOW WHERE THEY WOULD LIKE US TO SEND THE LAB SLIP  Active Problems    1  Arteriosclerotic coronary artery disease (414 00) (I25 10)   2  Benign hypertrophy of prostate (600 00) (N40 0)   3  Biceps tendinitis of left shoulder (726 12) (M75 22)   4  CA of prostate (185) (C61)   5  Chronic low back pain (724 2,338 29) (M54 5,G89 29)   6  Contusion With Intact Skin Surface Of The Right Middle Fingernail (923 3)   7  Depression (311) (F32 9)   8  Flu vaccine need (V04 81) (Z23)   9  Gastroenteritis (558 9) (K52 9)   10  GERD without esophagitis (530 81) (K21 9)   11  Hearing Loss (389 9)   12  Hematuria (599 70) (R31 9)   13  Hypercholesterolemia (272 0) (E78 00)   14  Hypertension (401 9) (I10)   15  Infection of nose (478 19) (J34 89)   16  Myocardial Infarction Arrhythmias (427 9)   17  Parkinson's disease (332 0) (G20)   18  Prostate cancer (185) (C61)   19  Prostatitis (601 9) (N41 9)   20  Unstable angina (411 1) (I20 0)    Current Meds   1  Aspirin 81 MG CAPS; TAKE 2 CAPSULE Daily; Therapy: (Recorded:74Gfj2524) to Recorded   2  Atorvastatin Calcium 20 MG Oral Tablet; Take 1 tablet daily; Therapy: (Recorded:64Otv5071) to Recorded   3  Bicalutamide 50 MG Oral Tablet; TAKE 1 TABLET DAILY; Therapy: (Recorded:26Vyw8953) to Recorded   4  Carbidopa-Levodopa  MG Oral Tablet; TAKE 2 TABLETS BY MOUTH 3 TIMES A   DAY; Therapy: 39RHA1504 to (Grace Grider)  Requested for: 59KNM3877; Last   Rx:29Jun2017 Ordered   5  Clopidogrel Bisulfate 75 MG Oral Tablet; Take 1 tablet daily Recorded   6  Lupron Depot (6-Month) KIT; INJECT EVERY 6 MONTHS; Therapy: (Recorded:36Clt1907) to Recorded   7  Metoprolol Tartrate 25 MG Oral Tablet; TAKE 1 TABLET TWICE DAILY; Therapy: (Vj Weeks) to Recorded   8  Rasagiline Mesylate 1 MG Oral Tablet (Azilect); Take 1 tablet daily; Therapy: 20ZAT6131 to (Evaluate:03Jcd5881)  Requested for: 61Hge0813; Last   Rx:94Jbk1447 Ordered    Allergies    1   No Known Drug Allergies    Signatures   Electronically signed by : Haim Escobar, ; Oct 10 2017  3:31PM EST                       (Author)

## 2018-01-10 ENCOUNTER — APPOINTMENT (OUTPATIENT)
Dept: PHYSICAL THERAPY | Facility: REHABILITATION | Age: 78
End: 2018-01-10
Payer: COMMERCIAL

## 2018-01-11 NOTE — RESULT NOTES
Message   Please fax lab results to Dr Emily Rubio, urologist  Patient will be scheduling an appointment  Verified Results  (Q) CBC (INCLUDES DIFF/PLT) (REFL) [id number] 12:00AM [facility]     Test Name Result Flag Reference   WHITE BLOOD CELL COUNT 7 3 Thousand/uL  3 8-10 8   RED BLOOD CELL COUNT 5 68 Million/uL  4 20-5 80   HEMOGLOBIN 17 2 g/dL H 13 2-17 1   HEMATOCRIT 52 7 % H 38 5-50 0   MCV 92 9 fL  80 0-100 0   MCH 30 3 pg  27 0-33 0   MCHC 32 6 g/dL  32 0-36 0   RDW 14 3 %  11 0-15 0   PLATELET COUNT 310 Thousand/uL  140-400   MPV 8 5 fL  7 5-11 5   ABSOLUTE NEUTROPHILS 5052 cells/uL  7378-4390   ABSOLUTE LYMPHOCYTES 1161 cells/uL  850-3900   ABSOLUTE MONOCYTES 752 cells/uL  200-950   ABSOLUTE EOSINOPHILS 299 cells/uL     ABSOLUTE BASOPHILS 37 cells/uL  0-200   NEUTROPHILS 69 2 %     LYMPHOCYTES 15 9 %     MONOCYTES 10 3 %     EOSINOPHILS 4 1 %     BASOPHILS 0 5 %       (Q) COMPREHENSIVE METABOLIC PNL W/ADJUSTED CALCIUM [id number] 12:00AM [facility]     Test Name Result Flag Reference   GLUCOSE 91 mg/dL  65-99   Fasting reference interval   UREA NITROGEN (BUN) 22 mg/dL  7-25   CREATININE 1 20 mg/dL H 0 70-1 18   For patients >52years of age, the reference limit  for Creatinine is approximately 13% higher for people  identified as -American  eGFR NON-AFR   AMERICAN 59 mL/min/1 73m2 L > OR = 60   eGFR AFRICAN AMERICAN 68 mL/min/1 73m2  > OR = 60   BUN/CREATININE RATIO 18 (calc)  6-22   SODIUM 139 mmol/L  135-146   POTASSIUM 4 5 mmol/L  3 5-5 3   CHLORIDE 103 mmol/L     CARBON DIOXIDE 22 mmol/L  19-30   CALCIUM 9 6 mg/dL  8 6-10 3   CALCIUM (ADJUSTED FOR$ALBUMIN) 9 5 mg/dL (calc)  8 6-10 2   PROTEIN, TOTAL 7 5 g/dL  6 1-8 1   ALBUMIN 4 5 g/dL  3 6-5 1   GLOBULIN 3 0 g/dL (calc)  1 9-3 7   ALBUMIN/GLOBULIN RATIO 1 5 (calc)  1 0-2 5   BILIRUBIN, TOTAL 0 8 mg/dL  0 2-1 2   ALKALINE PHOSPHATASE 95 U/L     AST 24 U/L  10-35   ALT 15 U/L  9-46     (1) PSA (SCREEN) (Dx V76 44 Screen for Prostate Cancer) 26BLW7014 12:00AM [facility]     Test Name Result Flag Reference   PSA, TOTAL 45 0 ng/mL H < OR = 4 0   This test was performed using the Siemens  chemiluminescent method  Values obtained from  different assay methods cannot be used  interchangeably  PSA levels, regardless of  value, should not be interpreted as absolute  evidence of the presence or absence of disease  (Q) LIPID PANEL WITH REFLEX TO DIRECT LDL 24ROV8417 12:00AM [facility]     Test Name Result Flag Reference   CHOLESTEROL, TOTAL 167 mg/dL  125-200   HDL CHOLESTEROL 39 mg/dL L > OR = 40   TRIGLICERIDES 065 mg/dL  <150   LDL-CHOLESTEROL 103 mg/dL (calc)  <130   Desirable range <100 mg/dL for patients with CHD or  diabetes and <70 mg/dL for diabetic patients with  known heart disease  CHOL/HDLC RATIO 4 3 (calc)  < OR = 5 0   NON HDL CHOLESTEROL 128 mg/dL (calc)     Target for non-HDL cholesterol is 30 mg/dL higher than   LDL cholesterol target  (Q) TSH, 3RD GENERATION W/REFLEX TO FT4 23NMH0776 12:00AM [facility]     Test Name Result Flag Reference   TSH W/REFLEX TO FT4 1 58 mIU/L  0 40-4 50     *(Q) VITAMIN D, 25-HYDROXY, LC/MS/MS 21UGG7401 12:00AM [facility]     Test Name Result Flag Reference   VITAMIN D, 25-OH, TOTAL 31 ng/mL     Vitamin D Status         25-OH Vitamin D:     Deficiency:                    <20 ng/mL  Insufficiency:             20 - 29 ng/mL  Optimal:                 > or = 30 ng/mL     For 25-OH Vitamin D testing on patients on   D2-supplementation and patients for whom quantitation   of D2 and D3 fractions is required, the QuestAssureD(TM)  25-OH VIT D, (D2,D3), LC/MS/MS is recommended: order   code 83987 (patients >2yrs)  For more information on this test, go to:  http://[employer]/faq/KZQ892  (This link is being provided for   informational/educational purposes only )     (Q) URINALYSIS REFLEX [id number] 12:00AM [facility]     Test Name Result Flag Reference   COLOR YELLOW  YELLOW   APPEARANCE CLEAR  CLEAR   SPECIFIC GRAVITY 1 021  1 001-1 035   PH 6 5  5 0-8 0   GLUCOSE NEGATIVE  NEGATIVE   BILIRUBIN NEGATIVE  NEGATIVE   KETONES NEGATIVE  NEGATIVE   OCCULT BLOOD NEGATIVE  NEGATIVE   PROTEIN NEGATIVE  NEGATIVE   NITRITE NEGATIVE  NEGATIVE   LEUKOCYTE ESTERASE NEGATIVE  NEGATIVE     (1) URINE CULTURE [id number] 12:00AM [facility]     Test Name Result Flag Reference   CULTURE, URINE, ROUTINE      CULTURE, URINE, ROUTINE         MICRO NUMBER:      [id number]    TEST STATUS:       FINAL    SPECIMEN SOURCE:   URINE    SPECIMEN QUALITY:  ADEQUATE    RESULT:            No Growth                                               We received a preserved urine culture transport                       tube and performed a urine culture  If this is                        not what you intended to order, please contact                       your local                        immediately so that we can adjust our billing                       appropriately  You may also inquire about                       alternative or additional testing  Discussion/Summary   Phone call to patient discussed lab results  Labs were all okay except PSA has increased significantly to 45  Recommend referral back to Dr Joseph Rosales, urologist and patient agrees to schedule for probable prostate biopsy

## 2018-01-11 NOTE — MISCELLANEOUS
Message   Recorded as Task   Date: 10/17/2017 03:15 PM, Created By: Paolo Noel   Task Name: Call Back   Assigned To: Aydin URIBE,TEAM   Regarding Patient: YURY FELICIANO, Status: Active   CommentTheron Kehr - 17 Oct 2017 3:15 PM     TASK CREATED  Caller: Anisa Hendricks; [phone]  Pt's wife checking status of Marilou Murray - 17 Oct 2017 3:56 PM     TASK EDITED  RECEIVED BENEFIT VERIFICATION, PT OWES 20% CO-PAY FOR MED  RELAYED TO ME HE ALSO HAS MEDICARE, WILL NEED TO RESEND INFORMATION, PT DOES NOT WANT TO WAIT, WILL BRING IN CARD TO SCAN, IF CO-PAY STILL OWED WILL PAY  WILL HAVE PT SIGN ABN  Active Problems    1  Arteriosclerotic coronary artery disease (414 00) (I25 10)   2  Benign hypertrophy of prostate (600 00) (N40 0)   3  Biceps tendinitis of left shoulder (726 12) (M75 22)   4  CA of prostate (185) (C61)   5  Chronic low back pain (724 2,338 29) (M54 5,G89 29)   6  Contusion With Intact Skin Surface Of The Right Middle Fingernail (923 3)   7  Depression (311) (F32 9)   8  Flu vaccine need (V04 81) (Z23)   9  Gastroenteritis (558 9) (K52 9)   10  GERD without esophagitis (530 81) (K21 9)   11  Hearing Loss (389 9)   12  Hematuria (599 70) (R31 9)   13  Hypercholesterolemia (272 0) (E78 00)   14  Hypertension (401 9) (I10)   15  Infection of nose (478 19) (J34 89)   16  Myocardial Infarction Arrhythmias (427 9)   17  Parkinson's disease (332 0) (G20)   18  Prostate cancer (185) (C61)   19  Prostatitis (601 9) (N41 9)   20  Unstable angina (411 1) (I20 0)    Current Meds   1  Aspirin 81 MG CAPS; TAKE 2 CAPSULE Daily; Therapy: (Recorded:37Ixr0554) to Recorded   2  Atorvastatin Calcium 20 MG Oral Tablet; Take 1 tablet daily; Therapy: (Recorded:94Wzx9131) to Recorded   3  Bicalutamide 50 MG Oral Tablet; TAKE 1 TABLET DAILY; Last Rx:48Ogg7871 Ordered   4  Carbidopa-Levodopa  MG Oral Tablet; TAKE 2 TABLETS BY MOUTH 3 TIMES A   DAY;    Therapy: 21Jun2011 to (Zenaida Antoine)  Requested for: 65HCH2671; Last   R01OQB2620 Ordered   5  Clopidogrel Bisulfate 75 MG Oral Tablet; Take 1 tablet daily Recorded   6  Lupron Depot (6-Month) KIT; INJECT EVERY 6 MONTHS; Therapy: (Recorded:44Afz4399) to Recorded   7  Metoprolol Tartrate 25 MG Oral Tablet; TAKE 1 TABLET TWICE DAILY; Therapy: (9640 1055) to Recorded   8  Rasagiline Mesylate 1 MG Oral Tablet (Azilect); Take 1 tablet daily; Therapy: 25NCI9360 to (Evaluate:41Zxj0260)  Requested for: 74Hoy6033; Last   Rx:37Mfq0822 Ordered    Allergies    1   No Known Drug Allergies    Signatures   Electronically signed by : Louie Quinonez RN; Oct 17 2017  3:56PM EST                       (Author)

## 2018-01-13 VITALS
SYSTOLIC BLOOD PRESSURE: 148 MMHG | HEIGHT: 70 IN | DIASTOLIC BLOOD PRESSURE: 96 MMHG | WEIGHT: 222.25 LBS | BODY MASS INDEX: 31.82 KG/M2 | HEART RATE: 81 BPM

## 2018-01-13 VITALS
SYSTOLIC BLOOD PRESSURE: 130 MMHG | DIASTOLIC BLOOD PRESSURE: 82 MMHG | HEIGHT: 71 IN | BODY MASS INDEX: 31.36 KG/M2 | WEIGHT: 224 LBS

## 2018-01-13 NOTE — RESULT NOTES
Verified Results  (Q) COMPREHENSIVE METABOLIC PNL W/ADJUSTED CALCIUM [id number] 12:00AM [facility]     Test Name Result Flag Reference   GLUCOSE 71 mg/dL  65-99   Fasting reference interval   UREA NITROGEN (BUN) 27 mg/dL H 7-25   CREATININE 1 12 mg/dL  0 70-1 18   For patients >52years of age, the reference limit  for Creatinine is approximately 13% higher for people  identified as -American  eGFR NON-AFR  AMERICAN 63 mL/min/1 73m2  > OR = 60   eGFR AFRICAN AMERICAN 74 mL/min/1 73m2  > OR = 60   BUN/CREATININE RATIO 24 (calc) H 6-22   SODIUM 134 mmol/L L 135-146   CHLORIDE 99 mmol/L     CARBON DIOXIDE 23 mmol/L  20-31   CALCIUM 9 4 mg/dL  8 6-10 3   CALCIUM (ADJUSTED FOR$ALBUMIN) 9 3 mg/dL (calc)  8 6-10 2   PROTEIN, TOTAL 8 1 g/dL  6 1-8 1   ALBUMIN 4 5 g/dL  3 6-5 1   GLOBULIN 3 6 g/dL (calc)  1 9-3 7   ALBUMIN/GLOBULIN RATIO 1 3 (calc)  1 0-2 5   BILIRUBIN, TOTAL 0 8 mg/dL  0 2-1 2   ALKALINE PHOSPHATASE 78 U/L     AST 26 U/L  10-35   ALT 15 U/L  9-46   POTASSIUM TNP mmol/L     TEST(S) NOT PERFORMED:      POTASSIUM    **********************************   * Test not performed  *   * No suitable specimen received  *   **********************************     (Q) LIPID PANEL WITH REFLEX TO DIRECT LDL [id number] 12:00AM [facility]     Test Name Result Flag Reference   CHOLESTEROL, TOTAL 184 mg/dL  125-200   HDL CHOLESTEROL 43 mg/dL  > OR = 40   TRIGLICERIDES 450 mg/dL  <150   LDL-CHOLESTEROL 115 mg/dL (calc)  <130   Desirable range <100 mg/dL for patients with CHD or  diabetes and <70 mg/dL for diabetic patients with  known heart disease  CHOL/HDLC RATIO 4 3 (calc)  < OR = 5 0   NON HDL CHOLESTEROL 141 mg/dL (calc)     Target for non-HDL cholesterol is 30 mg/dL higher than   LDL cholesterol target       SPECIMEN INTEGRITY COMPROMISED [id number] 12:00AM [facility]     Test Name Result Flag Reference   SPECIMEN INTEGRITY COMPROMISED See Below     Whole blood, unspun or partially spun gel barrier tube  was received more than 6 hours since collection  A   false elevation of K, Phos and LD as well as a false   decrease in glucose may occur due to prolonged contact   with red cells  Discussion/Summary   Labs okay  Continue present treatment

## 2018-01-14 VITALS
BODY MASS INDEX: 32.35 KG/M2 | HEART RATE: 72 BPM | RESPIRATION RATE: 16 BRPM | SYSTOLIC BLOOD PRESSURE: 132 MMHG | DIASTOLIC BLOOD PRESSURE: 74 MMHG | TEMPERATURE: 98.3 F | HEIGHT: 70 IN | WEIGHT: 226 LBS

## 2018-01-14 VITALS
DIASTOLIC BLOOD PRESSURE: 81 MMHG | WEIGHT: 230 LBS | SYSTOLIC BLOOD PRESSURE: 120 MMHG | HEIGHT: 71 IN | BODY MASS INDEX: 32.2 KG/M2

## 2018-01-14 VITALS
HEART RATE: 77 BPM | BODY MASS INDEX: 25.37 KG/M2 | HEIGHT: 71 IN | SYSTOLIC BLOOD PRESSURE: 160 MMHG | WEIGHT: 181.25 LBS | DIASTOLIC BLOOD PRESSURE: 79 MMHG

## 2018-01-14 VITALS
BODY MASS INDEX: 32.81 KG/M2 | DIASTOLIC BLOOD PRESSURE: 78 MMHG | OXYGEN SATURATION: 95 % | HEART RATE: 68 BPM | WEIGHT: 225.44 LBS | SYSTOLIC BLOOD PRESSURE: 134 MMHG

## 2018-01-15 VITALS
BODY MASS INDEX: 31.78 KG/M2 | WEIGHT: 227 LBS | SYSTOLIC BLOOD PRESSURE: 138 MMHG | DIASTOLIC BLOOD PRESSURE: 84 MMHG | TEMPERATURE: 97.7 F | HEART RATE: 64 BPM | HEIGHT: 71 IN | RESPIRATION RATE: 16 BRPM

## 2018-01-15 NOTE — PROGRESS NOTES
Active Problems    1  Arteriosclerotic coronary artery disease (414 00) (I25 10)   2  Benign hypertrophy of prostate (600 00) (N40 0)   3  Biceps tendinitis of left shoulder (726 12) (M75 22)   4  CA of prostate (185) (C61)   5  Chronic low back pain (724 2,338 29) (M54 5,G89 29)   6  Contusion With Intact Skin Surface Of The Right Middle Fingernail (923 3)   7  Depression (311) (F32 9)   8  Flu vaccine need (V04 81) (Z23)   9  Gastroenteritis (558 9) (K52 9)   10  GERD without esophagitis (530 81) (K21 9)   11  Hearing Loss (389 9)   12  Hematuria (599 70) (R31 9)   13  Hypercholesterolemia (272 0) (E78 00)   14  Hypertension (401 9) (I10)   15  Infection of nose (478 19) (J34 89)   16  Myocardial Infarction Arrhythmias (427 9)   17  Parkinson's disease (332 0) (G20)   18  Prostate cancer (185) (C61)   19  Prostatitis (601 9) (N41 9)   20  Unstable angina (411 1) (I20 0)    Current Meds   1  Aspirin 81 MG CAPS; TAKE 2 CAPSULE Daily; Therapy: (Recorded:09Rxd0299) to Recorded   2  Atorvastatin Calcium 20 MG Oral Tablet; Take 1 tablet daily; Therapy: (Recorded:41Fgy0346) to Recorded   3  Bicalutamide 50 MG Oral Tablet; TAKE 1 TABLET DAILY; Last Rx:17Oct2017 Ordered   4  Carbidopa-Levodopa  MG Oral Tablet; TAKE 2 TABLETS BY MOUTH 3 TIMES A   DAY; Therapy: 21OOT2255 to (Haseeb Morales)  Requested for: 06GVX5105; Last   Rx:29Jun2017 Ordered   5  Clopidogrel Bisulfate 75 MG Oral Tablet; Take 1 tablet daily Recorded   6  Lupron Depot (6-Month) KIT; INJECT EVERY 6 MONTHS; Therapy: (Recorded:43Vzi3846) to Recorded   7  Metoprolol Tartrate 25 MG Oral Tablet; TAKE 1 TABLET TWICE DAILY; Therapy: (9640 1055) to Recorded   8  Rasagiline Mesylate 1 MG Oral Tablet; Take 1 tablet daily; Therapy: 57HBD6913 to (Evaluate:92Rgy8161)  Requested for: 82Xzo8444; Last   Rx:[id number] Ordered    Allergies    1   No Known Drug Allergies    Procedure    Procedure:   PT  PRESENTS FOR A LUPRON 6MONTHS/45MG SHOT, WHICH HE TOLERATED WELL ON THE RIGHT BUTTOCK  PT  AWARE OF THE SIDE EFFECTS  Plan  CA of prostate    · Lupron Depot (6-Month) 45 MG Intramuscular Kit   For: CA of prostate; Dose of 45 MG;  Intramuscular; PATTI = N; Administered by: Hira Brown: 10/18/2017 12:00:00 AM; Last Updated By: Hira Brown; 10/18/2017 10:24:06 AM    Future Appointments    Date/Time Provider Specialty Site   10/30/2017 12:30 PM Domitila Talavera MD Neurology [facility] 21   11/30/2017 08:00 AM [place] [address]   01/17/2018 09:15 AM Kevin Law MD Urology [address]   10/24/2017 08:15 AM Senthil Swenson [facility] Neurology [facility] 21   10/19/2017 02:00 PM Marianna Hanson, Nurse Schedule  [facility] [address] [place] [address]     Signatures   Electronically signed by : Dustin Lee, ; Oct 18 2017 10:38AM EST                       (Author)    Electronically signed by : Yash Pandya MD; Oct 18 2017 12:58PM EST                       (Author)

## 2018-01-15 NOTE — RESULT NOTES
Discussion/Summary   Labs okay  Continue present treatment  Verified Results  (Q) CBC (INCLUDES DIFF/PLT) (REFL) [id number] 12:00AM [facility]     Test Name Result Flag Reference   WHITE BLOOD CELL COUNT 7 6 Thousand/uL  3 8-10 8   RED BLOOD CELL COUNT 5 00 Million/uL  4 20-5 80   HEMOGLOBIN 15 4 g/dL  13 2-17 1   HEMATOCRIT 44 8 %  38 5-50 0   MCV 89 7 fL  80 0-100 0   MCH 30 8 pg  27 0-33 0   MCHC 34 4 g/dL  32 0-36 0   RDW 14 6 %  11 0-15 0   PLATELET COUNT 584 Thousand/uL  140-400   MPV 8 7 fL  7 5-12 5   ABSOLUTE NEUTROPHILS 5054 cells/uL  1325-6691   ABSOLUTE LYMPHOCYTES 1414 cells/uL  850-3900   ABSOLUTE MONOCYTES 851 cells/uL  200-950   ABSOLUTE EOSINOPHILS 251 cells/uL     ABSOLUTE BASOPHILS 30 cells/uL  0-200   NEUTROPHILS 66 5 %     LYMPHOCYTES 18 6 %     MONOCYTES 11 2 %     EOSINOPHILS 3 3 %     BASOPHILS 0 4 %       (Q) COMPREHENSIVE METABOLIC PNL W/ADJUSTED CALCIUM [id number] 12:00AM [facility]     Test Name Result Flag Reference   GLUCOSE 103 mg/dL H 65-99   Fasting reference interval     For someone without known diabetes, a glucose value  between 100 and 125 mg/dL is consistent with  prediabetes and should be confirmed with a  follow-up test    UREA NITROGEN (BUN) 22 mg/dL  7-25   CREATININE 1 13 mg/dL  0 70-1 18   For patients >52years of age, the reference limit  for Creatinine is approximately 13% higher for people  identified as -American  eGFR NON-AFR   AMERICAN 63 mL/min/1 73m2  > OR = 60   eGFR AFRICAN AMERICAN 73 mL/min/1 73m2  > OR = 60   BUN/CREATININE RATIO   0-25   NOT APPLICABLE (calc)   SODIUM 138 mmol/L  135-146   POTASSIUM 4 5 mmol/L  3 5-5 3   CHLORIDE 103 mmol/L     CARBON DIOXIDE 26 mmol/L  20-31   CALCIUM 9 3 mg/dL  8 6-10 3   CALCIUM (ADJUSTED FOR$ALBUMIN) 9 4 mg/dL (calc)  8 6-10 2   PROTEIN, TOTAL 7 2 g/dL  6 1-8 1   ALBUMIN 4 3 g/dL  3 6-5 1   GLOBULIN 2 9 g/dL (calc)  1 9-3 7   ALBUMIN/GLOBULIN RATIO 1 5 (calc)  1 0-2 5   BILIRUBIN, TOTAL 0 8 mg/dL  0 2-1 2   ALKALINE PHOSPHATASE 62 U/L     AST 22 U/L  10-35   ALT 10 U/L  9-46     (Q) LIPID PANEL WITH REFLEX TO DIRECT LDL [id number] 12:00AM [facility]     Test Name Result Flag Reference   CHOLESTEROL, TOTAL 160 mg/dL  125-200   HDL CHOLESTEROL 40 mg/dL  > OR = 40   TRIGLICERIDES 192 mg/dL  <150   LDL-CHOLESTEROL 100 mg/dL (calc)  <130   Desirable range <100 mg/dL for patients with CHD or  diabetes and <70 mg/dL for diabetic patients with  known heart disease  CHOL/HDLC RATIO 4 0 (calc)  < OR = 5 0   NON HDL CHOLESTEROL 120 mg/dL (calc)     Target for non-HDL cholesterol is 30 mg/dL higher than   LDL cholesterol target  (Q) TSH, 3RD GENERATION W/REFLEX TO FT4 [id number] 12:00AM [facility]ue[facility]     Test Name Result Flag Reference   TSH W/REFLEX TO FT4 1 53 mIU/L  0 40-4 50     *(Q) VITAMIN D, 25-HYDROXY, LC/MS/MS [id number] 12:00AM [facility] Villanueva     Test Name Result Flag Reference   VITAMIN D, 25-OH, TOTAL 31 ng/mL     Vitamin D Status         25-OH Vitamin D:     Deficiency:                    <20 ng/mL  Insufficiency:             20 - 29 ng/mL  Optimal:                 > or = 30 ng/mL     For 25-OH Vitamin D testing on patients on   D2-supplementation and patients for whom quantitation   of D2 and D3 fractions is required, the QuestAssureD(TM)  25-OH VIT D, (D2,D3), LC/MS/MS is recommended: order   code 13571 (patients >2yrs)  For more information on this test, go to:  http://[place]/faq/GSS970  (This link is being provided for   informational/educational purposes only )     (Q) URINALYSIS REFLEX [id number] 12:00AM [facility] Chuy     Test Name Result Flag Reference   COLOR DARK YELLOW  YELLOW   APPEARANCE CLEAR  CLEAR   SPECIFIC GRAVITY 1 023  1 001-1 035   PH 6 0  5 0-8 0   GLUCOSE NEGATIVE  NEGATIVE   BILIRUBIN NEGATIVE  NEGATIVE   KETONES TRACE A NEGATIVE   OCCULT BLOOD NEGATIVE  NEGATIVE   PROTEIN NEGATIVE  NEGATIVE   NITRITE NEGATIVE NEGATIVE   LEUKOCYTE ESTERASE NEGATIVE  NEGATIVE

## 2018-01-16 NOTE — PROGRESS NOTES
Active Problems    1  Arteriosclerotic coronary artery disease (414 00) (I25 10)   2  Benign hypertrophy of prostate (600 00) (N40 0)   3  Biceps tendinitis of left shoulder (726 12) (M75 22)   4  CA of prostate (185) (C61)   5  Chronic low back pain (724 2,338 29) (M54 5,G89 29)   6  Contusion With Intact Skin Surface Of The Right Middle Fingernail (923 3)   7  Depression (311) (F32 9)   8  Flu vaccine need (V04 81) (Z23)   9  Gastroenteritis (558 9) (K52 9)   10  GERD without esophagitis (530 81) (K21 9)   11  Hearing Loss (389 9)   12  Hematuria (599 70) (R31 9)   13  Hypercholesterolemia (272 0) (E78 00)   14  Hypertension (401 9) (I10)   15  Infection of nose (478 19) (J34 89)   16  Myocardial Infarction Arrhythmias (427 9)   17  Parkinson's disease (332 0) (G20)   18  Prostate cancer (185) (C61)   19  Prostatitis (601 9) (N41 9)   20  Unstable angina (411 1) (I20 0)    Current Meds   1  Aspirin 81 MG CAPS; TAKE 2 CAPSULE Daily; Therapy: (Recorded:61Iqb4442) to Recorded   2  Atorvastatin Calcium 20 MG Oral Tablet; Take 1 tablet daily; Therapy: (Recorded:08Xet7568) to Recorded   3  Bicalutamide 50 MG Oral Tablet; TAKE 1 TABLET DAILY; Therapy: (Recorded:83Wls8765) to Recorded   4  Carbidopa-Levodopa  MG Oral Tablet; TAKE 2 TABLETS BY MOUTH 3 TIMES A   DAY; Therapy: [id number] to ([place])  Requested for: 01DWE2607; Last   Rx:29Jun2017 Ordered   5  Clopidogrel Bisulfate 75 MG Oral Tablet; Take 1 tablet daily Recorded   6  Lupron Depot (6-Month) KIT; INJECT EVERY 6 MONTHS; Therapy: (Recorded:88Ced0320) to Recorded   7  Metoprolol Tartrate 25 MG Oral Tablet; TAKE 1 TABLET TWICE DAILY; Therapy: (449 3748) to Recorded   8  Rasagiline Mesylate 1 MG Oral Tablet; Take 1 tablet daily; Therapy: 57QUO7966 to (Evaluate:94Hny2347)  Requested for: 25Apr2017; Last   Rx:25Apr2017 Ordered    Allergies    1   No Known Drug Allergies    Plan  CA of prostate    · Lupron Depot (3-Month) 22 5 MG Intramuscular Kit   Rx By: Jose G Avila; For: CA of prostate; Dose of 22 5 MG; Intramuscular; PATTI = N; Administered by: Romero Gilbert: 7/12/2017 12:00:00 AM; Last Updated By: Romero Gilbert; 8/3/2017 6:04:26 PM   · Lupron Depot (6-Month) 45 MG Intramuscular Kit   For: CA of prostate; Dose of 45 MG; Intramuscular; PATTI = N; Administered by: Romero Gilbert: 7/12/2017 12:00:00 AM; Last Updated By: Romero Gilbert; 8/2/2017 1:28:16 PM  Prostate cancer    · Lupron Depot (3-Month) 22 5 MG Intramuscular Kit   Rx By: Jose G Avila;  For: Prostate cancer; Dose of 22 5 MG; Intramuscular; PATTI = N; Administered by: Romero Gilbert: 7/12/2017 12:00:00 AM; Last Updated By: Romero Gilbert; 7/12/2017 11:03:19 AM    Future Appointments    Date/Time Provider Specialty Site   10/30/2017 12:30 PM Jhonny Liu MD Neurology [facility]   11/30/2017 08:00 AM Steve Garcia, [facility] Medicine [address]   10/13/2017 08:45 AM Wanda Reinoso MD Urology [address]   10/24/2017 08:15 AM Luis Turcios [facility] Neurology [facility]     Signatures   Electronically signed by : Savita Brown, ; Aug  3 2017  6:04PM EST                       (Author)    Electronically signed by : Evelyne Carter MD; Aug  4 2017 12:49PM EST                       (Author)

## 2018-01-23 VITALS
HEIGHT: 71 IN | DIASTOLIC BLOOD PRESSURE: 71 MMHG | WEIGHT: 225 LBS | SYSTOLIC BLOOD PRESSURE: 137 MMHG | HEART RATE: 72 BPM | BODY MASS INDEX: 31.5 KG/M2

## 2018-01-23 NOTE — RESULT NOTES
Discussion/Summary   Phone call to patient discussed CT of the chest negative for pulmonary nodules  Positive for known metastatic bone lesions  Will fax report to oncologist      Verified Results  * CT CHEST WO CONTRAST [id number] 12:34PM Shree Vaughn     Test Name Result Flag Reference   CT CHEST WO CONTRAST (Report)     CT CHEST WITHOUT IV CONTRAST     INDICATION: Abnormality on humerus x-ray  History of prostate CA  COMPARISON: X-ray right humerus dated December 11, 2017     TECHNIQUE: CT examination of the chest was performed without intravenous contrast  Reformatted images were created in axial, sagittal, and coronal planes  Radiation dose length product (DLP) for this visit: 554 mGy-cm   This examination, like all CT scans performed in the [facility], was performed utilizing techniques to minimize radiation dose exposure, including the use of iterative    reconstruction and automated exposure control  FINDINGS:     LUNGS: Calcified granuloma in the right upper lobe medially measuring 3 mm  Atelectatic changes both lung bases  PLEURA: Unremarkable  HEART/GREAT VESSELS: Cardiac size within normal limits  Densely atherosclerotic coronary arteries  Aortic atherosclerosis without aneurysm  MEDIASTINUM AND ROBERT: Calcified nodes in the right hilum and central mediastinum  CHEST WALL AND LOWER NECK:  Heterogeneous thyroid gland without discrete nodules  Normal      VISUALIZED STRUCTURES IN THE UPPER ABDOMEN: Cholelithiasis without CT evidence of cholecystitis  Right-sided renal collecting system calculi which are nonobstructing  Amorphous calcification interpolar left kidney which may be parenchymal or represent   a conglomeration of calyceal stones measuring up to 1 3 cm in total     OSSEOUS STRUCTURES: There are innumerable scattered sclerotic foci in the ribs, vertebral bodies, sacrum, manubrium, and scapula bilaterally   Given the history these are likely foci of sclerotic metastatic prostate cancer and would explain the findings   on x-ray exam          IMPRESSION:   Previous X-ray findings represent diffuse sclerotic metastatic lesions (prostate cancer) throughout the visualized skeleton  Old granulomatous disease in the right lung and right mediastinum  No significant pulmonary nodules are seen  Workstation performed: [id number]     Signed by:    Ray Hughes,    12/27/17

## 2018-01-23 NOTE — RESULT NOTES
Discussion/Summary   Labs okay  Continue present treatment  Verified Results  (Q) LIPID PANEL WITH REFLEX TO DIRECT LDL [id number] 12:00AM [facility] [facility]     Test Name Result Flag Reference   CHOLESTEROL, TOTAL 127 mg/dL  <200   HDL CHOLESTEROL 36 mg/dL L >93   TRIGLICERIDES 78 mg/dL  <671   LDL-CHOLESTEROL 75 mg/dL (calc)     Reference range: <100     Desirable range <100 mg/dL for patients with CHD or  diabetes and <70 mg/dL for diabetic patients with  known heart disease  LDL-C is now calculated using the Dimas-John   calculation, which is a validated novel method providing   better accuracy than the Friedewald equation in the   estimation of LDL-C  Jez Knott et al  Hayde Sorensen  2407;118(78): 0686-6889   (http://[employer]arp[employer].com/faq/KOY328)   CHOL/HDLC RATIO 3 5 (calc)  <5 0   NON HDL CHOLESTEROL 91 mg/dL (calc)  <130   For patients with diabetes plus 1 major ASCVD risk   factor, treating to a non-HDL-C goal of <100 mg/dL   (LDL-C of <70 mg/dL) is considered a therapeutic   option  (Q) COMPREHENSIVE METABOLIC PNL W/ADJUSTED CALCIUM [id number] 12:00AM [facility] [employer] [facility]     Test Name Result Flag Reference   GLUCOSE 100 mg/dL H 65-99   Fasting reference interval     For someone without known diabetes, a glucose value  between 100 and 125 mg/dL is consistent with  prediabetes and should be confirmed with a  follow-up test    UREA NITROGEN (BUN) 19 mg/dL  7-25   CREATININE 1 04 mg/dL  0 70-1 18   For patients >52years of age, the reference limit  for Creatinine is approximately 13% higher for people  identified as -American  eGFR NON-AFR   AMERICAN 69 mL/min/1 73m2  > OR = 60   eGFR AFRICAN AMERICAN 80 mL/min/1 73m2  > OR = 60   BUN/CREATININE RATIO   2-17   NOT APPLICABLE (calc)   SODIUM 139 mmol/L  135-146   POTASSIUM 4 1 mmol/L  3 5-5 3   CHLORIDE 103 mmol/L     CARBON DIOXIDE 26 mmol/L  20-31   CALCIUM 9 6 mg/dL  8 6-10 3   CALCIUM (ADJUSTED FOR$ALBUMIN) 9 7 mg/dL (calc) 8  6-10 2   PROTEIN, TOTAL 7 0 g/dL  6 1-8 1   ALBUMIN 4 3 g/dL  3 6-5 1   GLOBULIN 2 7 g/dL (calc)  1 9-3 7   ALBUMIN/GLOBULIN RATIO 1 6 (calc)  1 0-2 5   BILIRUBIN, TOTAL 0 8 mg/dL  0 2-1 2   ALKALINE PHOSPHATASE 68 U/L     AST 18 U/L  10-35   ALT 13 U/L  9-46

## 2018-01-23 NOTE — RESULT NOTES
Verified Results  * XR SHOULDER 2+ VIEW LEFT [id number] 11:50AM Karen Maldonado Order Number: [id number]   Performing Comments: room 5     Test Name Result Flag Reference   XR SHOULDER 2+ VW LEFT (Report)     LEFT SHOULDER     INDICATION: Left shoulder pain  Left biceps pain  M25 512: Pain in left shoulder  History taken directly from the electronic ordering system  COMPARISON: None     VIEWS: 3     IMAGES: 4     FINDINGS:     There is no acute fracture or dislocation  Deformity of the mid diaphysis of the clavicle with suspected callus formation likely related to healed old fracture  No degenerative changes  No lytic or blastic lesions are seen  There are atherosclerotic calcifications  Soft tissues are otherwise unremarkable  IMPRESSION:     No acute osseous abnormality  Workstation performed: [id number]     Signed by:    Rosendo Denny MD   12/15/17

## 2018-01-24 ENCOUNTER — OFFICE VISIT (OUTPATIENT)
Dept: UROLOGY | Facility: MEDICAL CENTER | Age: 78
End: 2018-01-24
Payer: COMMERCIAL

## 2018-01-24 VITALS
SYSTOLIC BLOOD PRESSURE: 120 MMHG | HEIGHT: 70 IN | WEIGHT: 226 LBS | DIASTOLIC BLOOD PRESSURE: 62 MMHG | BODY MASS INDEX: 32.35 KG/M2

## 2018-01-24 DIAGNOSIS — C61 PROSTATE CANCER (HCC): Primary | ICD-10-CM

## 2018-01-24 LAB
SL AMB  POCT GLUCOSE, UA: NORMAL
SL AMB LEUKOCYTE ESTERASE,UA: NORMAL
SL AMB POCT BILIRUBIN,UA: NORMAL
SL AMB POCT BLOOD,UA: NORMAL
SL AMB POCT CLARITY,UA: CLEAR
SL AMB POCT COLOR,UA: YELLOW
SL AMB POCT KETONES,UA: NORMAL
SL AMB POCT NITRITE,UA: NORMAL
SL AMB POCT PH,UA: 5.5
SL AMB POCT SPECIFIC GRAVITY,UA: 1.02

## 2018-01-24 PROCEDURE — 81003 URINALYSIS AUTO W/O SCOPE: CPT | Performed by: UROLOGY

## 2018-01-24 PROCEDURE — 99214 OFFICE O/P EST MOD 30 MIN: CPT | Performed by: UROLOGY

## 2018-01-24 RX ORDER — RASAGILINE 1 MG/1
1 TABLET ORAL
COMMUNITY
Start: 2015-06-03 | End: 2018-04-15 | Stop reason: SDUPTHER

## 2018-01-24 RX ORDER — BICALUTAMIDE 50 MG/1
50 TABLET, FILM COATED ORAL DAILY
Refills: 3 | COMMUNITY
Start: 2017-10-17 | End: 2018-01-24

## 2018-01-24 RX ORDER — ASPIRIN 81 MG/1
1 TABLET ORAL DAILY
COMMUNITY

## 2018-01-24 RX ORDER — ABIRATERONE 500 MG/1
1000 TABLET ORAL
COMMUNITY
Start: 2017-11-07 | End: 2018-04-05 | Stop reason: SDUPTHER

## 2018-01-24 RX ORDER — CLOPIDOGREL BISULFATE 75 MG/1
TABLET ORAL
COMMUNITY
Start: 2017-01-19 | End: 2020-04-13 | Stop reason: ALTCHOICE

## 2018-01-24 RX ORDER — ATORVASTATIN CALCIUM 20 MG/1
20 TABLET, FILM COATED ORAL DAILY
Refills: 3 | COMMUNITY
Start: 2017-11-25 | End: 2020-04-13 | Stop reason: ALTCHOICE

## 2018-01-24 RX ORDER — PREDNISONE 2.5 MG
2.5 TABLET ORAL
COMMUNITY
Start: 2017-11-07

## 2018-01-24 RX ORDER — RASAGILINE 1 MG/1
1 TABLET ORAL DAILY
Refills: 2 | COMMUNITY
Start: 2018-01-19 | End: 2018-01-24

## 2018-01-24 RX ORDER — SODIUM FLUORIDE 6 MG/ML
PASTE, DENTIFRICE DENTAL
Refills: 4 | COMMUNITY
Start: 2017-12-04

## 2018-01-24 RX ORDER — OYSTER SHELL CALCIUM WITH VITAMIN D 500; 200 MG/1; [IU]/1
1 TABLET, FILM COATED ORAL
COMMUNITY
Start: 2017-05-04

## 2018-01-24 NOTE — PATIENT INSTRUCTIONS
Prostate Cancer   AMBULATORY CARE:   Prostate cancer  develops in the male sex gland that helps make semen (prostate)  It is about the size of a walnut and wraps around the urethra  The urethra is the tube that carries urine from the bladder to the end of the penis  In most cases, prostate cancer is slow growing  Common symptoms include the following:   · Trouble starting or stopping the flow of urine    · Feeling the need to urinate often, especially at night    · Pain or a burning feeling when you urinate or ejaculate semen    · Trouble having an erection    · Blood in your urine or semen    · Not being able to urinate at all    · Pain or stiffness in your lower back, hips, or upper thighs  Call 911 for any of the following:   · Your leg feels warm, tender, and painful  It may look swollen and red  · You have chest pain when you take a deep breath or cough  · You suddenly feel lightheaded and short of breath  · You cough up blood  Contact your urologist or oncologist if:   · You have a fever  · You feel you cannot cope with your illness  · You have blood in your urine or have trouble urinating  · You have pain that does not decrease or go away after you take your medicine  · You have trouble having an erection  · You have questions or concerns about your condition or care  Treatment for prostate cancer: If you have early stage cancer, your healthcare provider may recommend that you have frequent tests and regular follow-up visits to watch for changes  You may also need any of the following:  · Hormone therapy  is medicine used to decrease testosterone (male hormone) levels  · Radiation therapy  is used to kill cancer cells with high-energy x-ray beams  You may receive radiation therapy from outside your body or from small beads or rods placed inside your prostate  · Surgery  may be needed, depending on the stage of the cancer  Part or all of your prostate may be removed   You may also need to have some lymph nodes taken out  This may help keep the cancer from spreading to other parts of your body  Manage your prostate cancer:   · Do not smoke  Nicotine can damage blood vessels and make it more difficult to manage your prostate cancer  Smoking also increases your risk for new or returning cancer and delays healing after treatment  Do not use e-cigarettes or smokeless tobacco in place of cigarettes or to help you quit  They still contain nicotine  Ask your healthcare provider for information if you currently smoke and need help quitting  · Limit or do not drink alcohol as directed  Limit alcohol to 2 drinks per day  A drink is 12 ounces of beer, 1½ ounces of liquor, or 5 ounces of wine  · Eat a variety of healthy foods  Healthy foods include fruits, vegetables, whole-grain breads, low-fat dairy products, beans, lean meats, and fish  Your healthcare provider may also recommend changes to the amounts of calcium and vitamin D you have each day  · Manage your weight  Obesity may increase your risk for problems from prostate cancer  Limit or do not have high-calorie foods or drinks  · Exercise as directed  Exercise may help you recover after treatment and may help prevent your prostate cancer from returning  Exercise can also help you manage your weight  Try to get at least 30 minutes of exercise 5 days a week, such as walking  · Ask about sexual activity  Ask your healthcare provider when it is safe for you to start having sex after your treatment  Medicines may be given if you have trouble getting or maintaining an erection  · Manage incontinence  You may have incontinence (trouble controlling when you urinate) after treatment  Ask your healthcare provider for information on managing urinary incontinence  You may be able to gain control over your urination with techniques or medicines  · Drink liquids as directed    Ask how much liquid to drink each day and which liquids are best for you  Drink extra liquids to prevent dehydration  You will also need to replace fluid if you are vomiting or have diarrhea from cancer treatments  Follow up with your urologist or oncologist as directed:  Write down your questions so you remember to ask them during your visits  © 2017 [address] [employer] Information is for End User's use only and may not be sold, redistributed or otherwise used for commercial purposes  All illustrations and images included in CareNotes® are the copyrighted property of [employer] , [employer]  or Tavo Lopez  The above information is an  only  It is not intended as medical advice for individual conditions or treatments  Talk to your doctor, nurse or pharmacist before following any medical regimen to see if it is safe and effective for you

## 2018-01-24 NOTE — PROGRESS NOTES
Assessment/Plan:    No problem-specific Assessment & Plan notes found for this encounter  Diagnoses and all orders for this visit:    Prostate cancer ([facility] [address] )  -     POCT urine dip auto non-scope    Other orders  -     Abiraterone Acetate 500 MG TABS; Take 1,000 mg by mouth  -     aspirin (ECOTRIN LOW STRENGTH) 81 mg EC tablet; Take 2 capsules by mouth daily  -     atorvastatin (LIPITOR) 20 mg tablet; Take 20 mg by mouth daily  -     rasagiline (AZILECT) 1 MG; Take 1 mg by mouth  -     Discontinue: bicalutamide (CASODEX) 50 mg tablet; Take 50 mg by mouth daily  -     calcium-vitamin D (OSCAL 500/200 D-3) 500 mg-200 units per tablet; Take 1 tablet by mouth  -     carbidopa-levodopa (SINEMET)  mg per tablet; TAKE 2 TABLETS BY MOUTH 3 TIMES A DAY  -     clopidogrel (PLAVIX) 75 mg tablet; TAKE 1 TABLET DAILY  -     denosumab (XGEVA) 120 mg/1 7 mL; Inject 120 mg under the skin  -     leuprolide (LUPRON DEPOT, 3-MONTH,) 22 5 mg injection; Inject 11 25 mg into the shoulder, thigh, or buttocks  -     metoprolol tartrate (LOPRESSOR) 25 mg tablet; Take 25 mg by mouth  -     predniSONE 2 5 mg tablet; Take 2 5 mg by mouth  -     Discontinue: rasagiline (AZILECT) 1 MG; Take 1 mg by mouth daily  -     PREVIDENT 5000 BOOSTER PLUS 1 1 % PSTE; USE AS DIRECTED          Subjective:      Patient ID: Lily Macias is a 68 y o  male  80-year-old male with metastatic prostate cancer  He is receiving Zechariah Montiel and Fatimah Kennedy at [facility]  He is here today for Lupron injection  He reports he is voiding well  He feels well and denies back or bone pain  He is eating well and has not had any weight loss  No constitutional symptoms  There is no gross hematuria,  dysuria or symptoms of infection  His control has been better since he has been on androgen deprivation therapy       PSA was 0 15 on January 10, 2018  He received his last 6 month Depo injection of Lupron in October 2017     The following portions of the patient's history were reviewed and updated as appropriate: allergies, current medications, past family history, past medical history, past social history, past surgical history and problem list     Review of Systems   Neurological: Positive for tremors  Parkinson's disease   All other systems reviewed and are negative  Objective:     Physical Exam   Constitutional: He is oriented to person, place, and time  He appears well-developed and well-nourished  HENT:   Head: Normocephalic and atraumatic  Eyes: Conjunctivae are normal    Neck: Neck supple  Cardiovascular: Normal rate  Pulmonary/Chest: Effort normal    Abdominal: Soft  Bowel sounds are normal  He exhibits no distension and no mass  There is no tenderness  There is no rebound, no guarding and no CVA tenderness  Hernia confirmed negative in the right inguinal area and confirmed negative in the left inguinal area  Umbilical hernia   Genitourinary: Rectum normal, testes normal and penis normal  Prostate is enlarged  Prostate is not tender  Right testis shows no mass  Left testis shows no mass  No phimosis or hypospadias  Genitourinary Comments: Prostate small and free of induration   Musculoskeletal: He exhibits no edema  Lymphadenopathy:     He has no cervical adenopathy  Neurological: He is alert and oriented to person, place, and time  Skin: Skin is warm and dry  Psychiatric: He has a normal mood and affect   His behavior is normal  Judgment and thought content normal

## 2018-02-08 ENCOUNTER — OFFICE VISIT (OUTPATIENT)
Dept: NEUROLOGY | Facility: CLINIC | Age: 78
End: 2018-02-08
Payer: COMMERCIAL

## 2018-02-08 VITALS
SYSTOLIC BLOOD PRESSURE: 124 MMHG | WEIGHT: 240 LBS | BODY MASS INDEX: 34.44 KG/M2 | DIASTOLIC BLOOD PRESSURE: 68 MMHG | HEART RATE: 66 BPM

## 2018-02-08 DIAGNOSIS — G20 PARKINSON DISEASE (HCC): Primary | ICD-10-CM

## 2018-02-08 PROCEDURE — 99214 OFFICE O/P EST MOD 30 MIN: CPT | Performed by: PHYSICIAN ASSISTANT

## 2018-02-08 PROCEDURE — 3725F SCREEN DEPRESSION PERFORMED: CPT | Performed by: PHYSICIAN ASSISTANT

## 2018-02-08 RX ORDER — ABIRATERONE 500 MG/1
250 TABLET ORAL 2 TIMES DAILY
COMMUNITY
Start: 2018-01-31 | End: 2020-04-13 | Stop reason: ALTCHOICE

## 2018-02-08 NOTE — PATIENT INSTRUCTIONS
Pt reports that he is doing well on the present dosage of carbidopa/levadop and rasagiline  He and his wife do admit to more freezing episodes but he reports that he has tried increasing carbidopa/levadopa in the past   He does not feel well on increased dosages  He will continue the present medications  He will follow-up in 4 months

## 2018-02-08 NOTE — PROGRESS NOTES
Patient ID: Xu Guthrie is a 68 y o  male  Assessment/Plan:    Parkinson's disease ([facility] [address] )  Pt reports that he is doing well on the present dosage of carbidopa/levadop and rasagiline  He and his wife do admit to more freezing episodes but he reports that he has tried increasing carbidopa/levadopa in the past   He does not feel well on increased dosages  He will continue the present medications  He will follow-up in 4 months  Diagnoses and all orders for this visit:    Parkinson disease ([facility] [address] )    Other orders  -     zidovudine (RETROVIR) 10 MG/ML syrup; Take by mouth 2 (two) times a day  -     Abiraterone Acetate 500 MG TABS; Take 1,000 mg by mouth daily         Subjective:    HPI    Xu Guthrie is a 68year old male  with prostate cancer on hormonal treatment and Parkinson's disease who presents for follow up  To review, symptoms began with right hand rest tremor and decrease in fine motor movements which progressed to include right lower extremity tremor and slowness in movements  He was diagnosed August 2010 and started on Sinemet 25/100 tid and Azilect  Previous trial of ropinirole ER was associated with side effects  Corene Anger was not covered  At his last visit he was overall doing well with some improvement of wearing off with increased Sinemet  He was having some freezing and was sent for PT  No medication changes were made  He remains on Sinemet 1 5tabs [id number] (5am, 8am, 11am, 2pm, 5pm) and Azilect 1mg daily  He will take 2tabs of Sinemet if he is busy and doing a lot of activity  He denies any clear improvement on the higher dose however so he does not take 2tabs everyday  He is still freezing more often later in the day when he is tired  It is worse when he goes through doorways  His wife reports that he will freeze when walking into one room from another  He did not go to the therapy  He does still do exercises every day   He will stretch, ride the bike and walk on the treadmill  He has also set up a maze for himself at home  He does shuffle at times but denies any falls  He is able to perform all of his ADLs well  He dresses and showers without issues  He is still driving and denies any issues  His wife is always in the car with him and she feels that he is still driving well  He was recently found to elevated PSA and may need to start a new treatment  His memory is still very good  He is sleeping well  He has a good appetite and denies any issues with swallowing  He finds that his tremors improve when he takes his dose of medication  He denies any improvement of the freezing even on higher doses of Sinemet  The following portions of the patient's history were reviewed and updated as appropriate: allergies, current medications, past family history, past medical history, past social history, past surgical history and problem list          Objective:    Blood pressure 124/68, pulse 66, weight 109 kg (240 lb)  Physical Exam   Constitutional: He appears well-developed and well-nourished  Eyes: EOM are normal  Pupils are equal, round, and reactive to light  Neurological: He has normal strength and normal reflexes  Psychiatric: His speech is normal    Vitals reviewed  Neurological Exam    Mental Status  The patient is and oriented to person, place, time, and situation  His recent and remote memory are normal  His speech is normal  His language is fluent with no aphasia  Cranial Nerves    CN II: The patient's visual acuity and visual fields are normal   CN III, IV, VI: The patient's pupils are equally round and reactive to light and ocular movements are normal   CN V: The patient has normal facial sensation  CN VII:  The patient has symmetric facial movement  CN VIII:  The patient's hearing is normal   CN IX, X: The patient has symmetric palate movement and normal gag reflex    CN XI: The patient's shoulder shrug strength is normal   CN XII: The patient's tongue is midline without atrophy or fasciculations  Motor   His strength is 5/5 throughout all four extremities  Finger to nose was normal  An intermittent mild amplitude rest tremor is present in the right upper extremity which disappears with action and reemerges 1,0  There is no action tremor  Mild bradykinesia right finger taps 1,0 , handgrips 1,0 rapid alternating movements 1,1  Normal heel and toe taps  There is no dystonia, or dyskinesia     Reflexes  Deep tendon reflexes are 2+ and symmetric in all four extremities with downgoing toes bilaterally  Gait and Coordination   He has a wide stance  Arose without using hands on second attempt  Posture slightly stooped with right tilt  Good stride and arm swing  Wide based gait  Slight freezing on turn  Reemergent resting tremor on the right with ambulation, tendency to hold the right hand slightly turned when walking  Did not take any steps with pull back testing  ROS:    Review of Systems   Constitutional: Negative  HENT: Negative  Eyes: Negative  Respiratory: Negative  Cardiovascular: Negative  Gastrointestinal: Negative  Endocrine: Negative  Genitourinary: Positive for urgency  Musculoskeletal: Negative  Skin: Negative  Allergic/Immunologic: Negative  Neurological: Positive for tremors  Hematological: Negative  Psychiatric/Behavioral: Negative

## 2018-02-28 NOTE — MISCELLANEOUS
Message  I spoke with Mr Cate Yan today over the phone at about 1:45 PM  I informed him that there was an incidental finding of 2 nodular lesions in the right hemithorax seen during the left humerus x-ray  Advised that this would likely need further evaluation possibly through a CT scan of his chest  He would follow-up with his family physician in this regard  He also reported that his left shoulder pain is doing much better at this time  I will follow-up with him as scheduled with regards to his left shoulder pain  Signatures   Electronically signed by :  ELMA Naidu ; Dec 15 2017  1:49PM EST                       (Author)

## 2018-02-28 NOTE — RESULT NOTES
Verified Results  * XR HUMERUS LEFT [id number] 11:50AM Vandana Sterling Order Number: [id number]     Test Name Result Flag Reference   XR HUMERUS LEFT (Report)     This is a summary report  The complete report is available in the patient's medical record  If you cannot access the medical record, please contact the sending organization for a detailed fax or copy  LEFT HUMERUS     INDICATION: Left humerus pain  M25 512: Pain in left shoulder  History taken directly from the electronic ordering system  Left biceps pain  COMPARISON: None     VIEWS: 2     IMAGES: 2     The study is submitted for interpretation at this time  FINDINGS:     There is no acute fracture or dislocation  No degenerative changes  No lytic or blastic lesions are seen  2 rounded nodular densities noted in the right upper lung field, indeterminate measuring approximately 9 to 11 mm  CT of the chest recommended  IMPRESSION:     2 nodular lesions, measuring approximately a centimeter each in the visualized portions of the right hemithorax are indeterminate  CT of the chest advised for further characterization  Workstation performed: [id number]     Signed by:    Lennie Kevin MD   12/15/17

## 2018-04-05 ENCOUNTER — CLINICAL SUPPORT (OUTPATIENT)
Dept: UROLOGY | Facility: MEDICAL CENTER | Age: 78
End: 2018-04-05
Payer: COMMERCIAL

## 2018-04-05 VITALS
HEIGHT: 70 IN | DIASTOLIC BLOOD PRESSURE: 74 MMHG | SYSTOLIC BLOOD PRESSURE: 136 MMHG | WEIGHT: 224.4 LBS | BODY MASS INDEX: 32.13 KG/M2

## 2018-04-05 DIAGNOSIS — C61 PROSTATE CANCER (HCC): Primary | ICD-10-CM

## 2018-04-05 DIAGNOSIS — C61 MALIGNANT NEOPLASM OF PROSTATE (HCC): Primary | ICD-10-CM

## 2018-04-05 PROCEDURE — 96402 CHEMO HORMON ANTINEOPL SQ/IM: CPT | Performed by: UROLOGY

## 2018-04-05 NOTE — PROGRESS NOTES
Patient presents today for his 6 month Lupron injection  The skin was prepped with alcohol and the medication was injected into the right upper buttock  A band-aid was applied  The patient has no questions or concerns and is satisfied with his care

## 2018-04-10 ENCOUNTER — HOSPITAL ENCOUNTER (OUTPATIENT)
Dept: RADIOLOGY | Facility: HOSPITAL | Age: 78
Discharge: HOME | End: 2018-04-10
Payer: COMMERCIAL

## 2018-04-10 ENCOUNTER — OFFICE VISIT (OUTPATIENT)
Dept: OBGYN CLINIC | Facility: HOSPITAL | Age: 78
End: 2018-04-10
Payer: COMMERCIAL

## 2018-04-10 VITALS
SYSTOLIC BLOOD PRESSURE: 126 MMHG | BODY MASS INDEX: 31.64 KG/M2 | HEART RATE: 79 BPM | WEIGHT: 221 LBS | HEIGHT: 70 IN | DIASTOLIC BLOOD PRESSURE: 76 MMHG

## 2018-04-10 DIAGNOSIS — M53.3 PAIN IN THE COCCYX: Primary | ICD-10-CM

## 2018-04-10 DIAGNOSIS — M53.3 PAIN IN THE COCCYX: ICD-10-CM

## 2018-04-10 PROCEDURE — 99203 OFFICE O/P NEW LOW 30 MIN: CPT | Performed by: PHYSICIAN ASSISTANT

## 2018-04-10 PROCEDURE — 72220 X-RAY EXAM SACRUM TAILBONE: CPT

## 2018-04-10 RX ORDER — ACETAMINOPHEN AND CODEINE PHOSPHATE 300; 30 MG/1; MG/1
1 TABLET ORAL EVERY 6 HOURS PRN
Qty: 30 TABLET | Refills: 0 | Status: SHIPPED | OUTPATIENT
Start: 2018-04-10 | End: 2018-04-10 | Stop reason: SDUPTHER

## 2018-04-10 RX ORDER — ACETAMINOPHEN AND CODEINE PHOSPHATE 300; 30 MG/1; MG/1
1 TABLET ORAL EVERY 6 HOURS PRN
Qty: 30 TABLET | Refills: 0 | Status: SHIPPED | OUTPATIENT
Start: 2018-04-10 | End: 2020-04-13 | Stop reason: ALTCHOICE

## 2018-04-10 NOTE — PROGRESS NOTES
Assessment/Plan   Diagnoses and all orders for this visit:    Pain in the coccyx  -     XR sacrum and coccyx; Future  -     acetaminophen-codeine (TYLENOL #3) 300-30 mg per tablet; Take 1 tablet by mouth every 6 (six) hours as needed for moderate pain          Subjective   Patient ID: Harjit Aburto is a 68 y o  male  Vitals:    04/10/18 1040   BP: 126/76   Pulse: 78     78yo male comes in for an evaluation of his coccyx  He was putting on his pants 10 days ago and fell back into a sitting position  Since then, he has been having soreness in the coccyx area  He can only sit for 10-20 minutes before having to move because of the pain  The dull pain does not radiate and is not associated with numbness  He denies any osteoporosis or other problems with his bones  He does have a history of metastatic prostate cancer with bony mets to the ribs  The following portions of the patient's history were reviewed and updated as appropriate: allergies, current medications, past family history, past medical history, past social history, past surgical history and problem list     Review of Systems   Constitutional: Negative  HENT: Positive for hearing loss  Eyes: Negative  Respiratory: Negative  Cardiovascular: Negative  Gastrointestinal: Negative  Endocrine: Negative  Genitourinary: Negative  Allergic/Immunologic: Negative  Neurological: Negative  Hematological: Negative  Psychiatric/Behavioral: Negative  Ortho Exam  Past Medical History:   Diagnosis Date    Hypertension     [facility]'s disease ([facility] [address] )     [facility]      Past Surgical History:   Procedure Laterality Date    CARDIAC SURGERY      Stent     History reviewed  No pertinent family history  Social History     Occupational History    Not on file       Social History Main Topics    Smoking status: Never Smoker    Smokeless tobacco: Never Used    Alcohol use Yes    Drug use: No    Sexual activity: Not on file         Objective   Physical Exam     bilateral Low back / lumbar spine:  - Appearance   Inspection of back is normal with normal lordosis and no scoliosis, erythema, ecchymosis, or rash  - Palpation   + Midline bony tenderness: of the coccygeal area   - ROM   flexion: 90, extension: 10     - Sensation   No numbness in all LE dermatomes bilaterally    - Special Tests   SLR negative    I have personally reviewed pertinent films in PACS and my interpretation is no displaced fx  No coccygeal bony lesion noted

## 2018-04-12 ENCOUNTER — TELEPHONE (OUTPATIENT)
Dept: OBGYN CLINIC | Facility: HOSPITAL | Age: 78
End: 2018-04-12

## 2018-04-12 NOTE — TELEPHONE ENCOUNTER
Received a call from Radiology in [facility] and they are asking you to look at the patient's xray  There are significant findings

## 2018-04-15 DIAGNOSIS — G20 PARKINSON'S DISEASE (HCC): Primary | ICD-10-CM

## 2018-04-16 RX ORDER — RASAGILINE 1 MG/1
TABLET ORAL
Qty: 30 TABLET | Refills: 2 | Status: SHIPPED | OUTPATIENT
Start: 2018-04-16 | End: 2018-07-14 | Stop reason: SDUPTHER

## 2018-05-21 DIAGNOSIS — G20 PARKINSON'S DISEASE (HCC): Primary | ICD-10-CM

## 2018-06-03 PROBLEM — M75.32 CALCIFIC TENDINITIS OF LEFT SHOULDER: Status: ACTIVE | Noted: 2017-12-11

## 2018-06-03 PROBLEM — R73.01 ELEVATED FASTING GLUCOSE: Status: ACTIVE | Noted: 2017-07-14

## 2018-06-03 PROBLEM — R91.8 PULMONARY NODULES: Status: ACTIVE | Noted: 2017-12-15

## 2018-06-04 ENCOUNTER — OFFICE VISIT (OUTPATIENT)
Dept: FAMILY MEDICINE CLINIC | Facility: CLINIC | Age: 78
End: 2018-06-04
Payer: COMMERCIAL

## 2018-06-04 VITALS
DIASTOLIC BLOOD PRESSURE: 84 MMHG | WEIGHT: 204 LBS | TEMPERATURE: 98.2 F | SYSTOLIC BLOOD PRESSURE: 120 MMHG | HEART RATE: 64 BPM | HEIGHT: 69 IN | BODY MASS INDEX: 30.21 KG/M2 | RESPIRATION RATE: 16 BRPM

## 2018-06-04 DIAGNOSIS — C61 PROSTATE CANCER (HCC): ICD-10-CM

## 2018-06-04 DIAGNOSIS — E78.5 HYPERLIPIDEMIA, UNSPECIFIED HYPERLIPIDEMIA TYPE: ICD-10-CM

## 2018-06-04 DIAGNOSIS — I10 BENIGN ESSENTIAL HYPERTENSION: Primary | ICD-10-CM

## 2018-06-04 DIAGNOSIS — K21.9 GERD WITHOUT ESOPHAGITIS: ICD-10-CM

## 2018-06-04 DIAGNOSIS — G20 PARKINSON'S DISEASE (HCC): ICD-10-CM

## 2018-06-04 DIAGNOSIS — I25.10 CORONARY ARTERY DISEASE INVOLVING NATIVE HEART WITHOUT ANGINA PECTORIS, UNSPECIFIED VESSEL OR LESION TYPE: ICD-10-CM

## 2018-06-04 PROCEDURE — 3074F SYST BP LT 130 MM HG: CPT | Performed by: FAMILY MEDICINE

## 2018-06-04 PROCEDURE — 99214 OFFICE O/P EST MOD 30 MIN: CPT | Performed by: FAMILY MEDICINE

## 2018-06-04 PROCEDURE — 3079F DIAST BP 80-89 MM HG: CPT | Performed by: FAMILY MEDICINE

## 2018-06-04 NOTE — ASSESSMENT & PLAN NOTE
Stable with recent PSA very low  Continue present treatment and follow up with Urology and Oncology as scheduled

## 2018-06-04 NOTE — PROGRESS NOTES
Assessment/Plan:  Patient to continue present treatment  He is instructed to follow a low-fat and a low-salt diet and continue regular exercise as tolerated  Patient to follow up with the specialist as scheduled and return to the office in 6 months for appointment and fasting labs  GERD without esophagitis  Asymptomatic  Takes Tums rarely  Benign essential hypertension  BP well controlled  Continue present treatment and follow a low-salt diet  CAD (coronary artery disease)  Stable  Continue present treatment and follow up with Cardiology as scheduled  Parkinson's disease ([facility] )  Relatively stable  Continue present treatment and follow up with Neurology as scheduled  Prostate cancer ([facility] )  Stable with recent PSA very low  Continue present treatment and follow up with Urology and Oncology as scheduled  Hyperlipidemia  Lipids at goal   Continue present treatment and follow a low-fat and low-cholesterol diet  Diagnoses and all orders for this visit:    Benign essential hypertension    Hyperlipidemia, unspecified hyperlipidemia type    GERD without esophagitis    Coronary artery disease involving native heart without angina pectoris, unspecified vessel or lesion type    Parkinson's disease ([facility] )    Prostate cancer ([place] [address] )          Subjective:      Patient ID: Nathan Jones is a 68 y o  male  Patient is here for routine appointment for chronic conditions and he is not fasting today  Patient had a CBC and a CMP 4 weeks ago ordered by oncologist   Patient has been feeling fairly well overall  She remains fairly active walking on his treadmill and bike for 12 min daily and doing yd work  Patient follows with Neurology and Urology regularly  He has an upcoming appointment with his cardiologist   He admits to decreased appetite and weight loss  Patient has never had colonoscopy and again refuses  Hypertension   This is a chronic problem  The problem is controlled   Associated symptoms include malaise/fatigue  Pertinent negatives include no anxiety, blurred vision, chest pain, headaches, neck pain, orthopnea, palpitations, peripheral edema, PND or shortness of breath  Risk factors for coronary artery disease include dyslipidemia, male gender and family history  Past treatments include beta blockers  The current treatment provides significant improvement  There are no compliance problems  Hypertensive end-organ damage includes CAD/MI  There is no history of CVA or heart failure  Hyperlipidemia   This is a chronic problem  The problem is controlled  Recent lipid tests were reviewed and are normal  He has no history of diabetes or hypothyroidism  Pertinent negatives include no chest pain, focal sensory loss, focal weakness, leg pain, myalgias or shortness of breath  Current antihyperlipidemic treatment includes statins  The current treatment provides significant improvement of lipids  There are no compliance problems  The following portions of the patient's history were reviewed and updated as appropriate: allergies, current medications, past family history, past medical history, past social history, past surgical history and problem list     Review of Systems   Constitutional: Positive for malaise/fatigue  Eyes: Negative for blurred vision  Respiratory: Negative for shortness of breath  Cardiovascular: Negative for chest pain, palpitations, orthopnea and PND  Musculoskeletal: Negative for myalgias and neck pain  Neurological: Negative for focal weakness and headaches  Objective:      /84   Pulse 64   Temp 98 2 °F (36 8 °C)   Resp 16   Ht 5' 8 5" (1 74 m)   Wt 92 5 kg (204 lb)   BMI 30 57 kg/m²          Physical Exam   Constitutional: He is oriented to person, place, and time  He appears well-developed and well-nourished  No distress  HENT:   Head: Normocephalic     Right Ear: External ear normal    Left Ear: External ear normal    Nose: Nose normal  Mouth/Throat: Oropharynx is clear and moist    Eyes: Conjunctivae are normal  No scleral icterus  Neck: Neck supple  No thyromegaly present  Cardiovascular: Normal rate and regular rhythm  Pulmonary/Chest: Effort normal and breath sounds normal    Abdominal: Soft  There is no tenderness  Musculoskeletal: He exhibits no edema  Lymphadenopathy:     He has no cervical adenopathy  Neurological: He is alert and oriented to person, place, and time  Skin: Skin is warm and dry  Psychiatric: He has a normal mood and affect

## 2018-06-11 NOTE — PROGRESS NOTES
Patient ID: Naila Serra is a 68 y o  male  Assessment/Plan:    No problem-specific Assessment & Plan notes found for this encounter  {Assess/PlanSmartLinks:82727}       Subjective:    HPI    {[facility] HPI texts:13182}    {Common ambulatory SmartLinks:20876}         Objective: There were no vitals taken for this visit      Physical Exam    Neurological Exam      ROS:    Review of Systems

## 2018-06-13 ENCOUNTER — OFFICE VISIT (OUTPATIENT)
Dept: NEUROLOGY | Facility: CLINIC | Age: 78
End: 2018-06-13
Payer: COMMERCIAL

## 2018-06-13 VITALS
BODY MASS INDEX: 28.99 KG/M2 | HEIGHT: 70 IN | SYSTOLIC BLOOD PRESSURE: 118 MMHG | WEIGHT: 202.5 LBS | DIASTOLIC BLOOD PRESSURE: 64 MMHG | HEART RATE: 66 BPM

## 2018-06-13 DIAGNOSIS — G20 PARKINSON DISEASE (HCC): Primary | ICD-10-CM

## 2018-06-13 DIAGNOSIS — G20 PARKINSON'S DISEASE (HCC): ICD-10-CM

## 2018-06-13 PROCEDURE — 99214 OFFICE O/P EST MOD 30 MIN: CPT | Performed by: PHYSICIAN ASSISTANT

## 2018-06-13 NOTE — PROGRESS NOTES
Patient ID: Margaret Seen is a 68 y o  male  Assessment/Plan:    Parkinson's disease ([facility] [address] )  Pt's exam is stable to improved from his last appt  No medication changes will be made at this time  He will continue carbidopa/levadopa 1 5 tabs every 3 hours and rasagiline 1mg daily  Should his symptoms worsen he will call the office  He will follow-up in 4 months with me and 8 months with Dr Arnav Carlton  Problem List Items Addressed This Visit     Parkinson's disease ([facility] [address] )     Pt's exam is stable to improved from his last appt  No medication changes will be made at this time  He will continue carbidopa/levadopa 1 5 tabs every 3 hours and rasagiline 1mg daily  Should his symptoms worsen he will call the office  He will follow-up in 4 months with me and 8 months with Dr Arnav Carlton  Other Visit Diagnoses     Parkinson disease [facility])    -  Primary             Subjective:    HPI    Margaret Seen is a 68year old male  with prostate cancer on hormonal treatment and Parkinson's disease who presents for follow up  To review, symptoms began with right hand rest tremor and decrease in fine motor movements which progressed to include right lower extremity tremor and slowness in movements  He was diagnosed August 2010 and started on Sinemet 25/100 tid and Azilect  Previous trial of ropinirole ER was associated with side effects  Malulg Arriola was not covered       At his last visit he was overall doing well with some improvement of wearing off with increased Sinemet  He was having some freezing and was sent for PT  No medication changes were made  He remains on Sinemet 1 5tabs [id number] (6am, 9am, 12pm, 3pm, 6pm) and Azilect 1mg daily      He is still freezing more often later in the day when he is tired  It is worse when he goes through doorways and is around groups of people  His wife reports that he will freeze when walking into one room from another  He did not go to the therapy   He does still do exercises every day  He will stretch, ride the bike, do yardwork and walk on the treadmill  He has also set up a maze for himself at home  He does shuffle at times  He did have one fall while putting on his pants  He was bruised but otherwise not injured  He is able to perform all of his ADLs well  He dresses and showers without issues  He is still driving and denies any issues  His wife is always in the car with him and she feels that he is still driving well  He was recently found to elevated PSA and may need to start a new treatment  His memory is still very good  He is sleeping well  He reports decreased appetite when he started [place] but it has improved recently  He denies any issues with swallowing  He did have some difficulty sleeping when starting the Xgeva but that has improved as well  He finds that his tremors improve when he takes his dose of medication  He denies any improvement of the freezing even on higher doses of Sinemet  The following portions of the patient's history were reviewed and updated as appropriate: allergies, current medications, past family history, past medical history, past social history, past surgical history and problem list          Objective:    Blood pressure 118/64, pulse 66, height 5' 10" (1 778 m), weight 91 9 kg (202 lb 8 oz)  Physical Exam   Constitutional: He appears well-developed and well-nourished  Eyes: EOM are normal  Pupils are equal, round, and reactive to light  Neurological: He has normal strength and normal reflexes  Psychiatric: His speech is normal    Vitals reviewed  Neurological Exam    Mental Status  The patient is alert and oriented to person, place, time, and situation  His recent and remote memory are normal  He has no visuospatial neglect  His speech is normal  His language is fluent with no aphasia  He has normal attention span and concentration  He follows multi-step commands  He has a normal fund of knowledge      Cranial Nerves    CN II: The patient's visual acuity and visual fields are normal   CN III, IV, VI: The patient's pupils are equally round and reactive to light and ocular movements are normal   CN V: The patient has normal facial sensation  CN VII:  The patient has symmetric facial movement  CN VIII:  The patient's hearing is normal   CN IX, X: The patient has symmetric palate movement and normal gag reflex  CN XI: The patient's shoulder shrug strength is normal   CN XII: The patient's tongue is midline without atrophy or fasciculations  Motor   His strength is 5/5 throughout all four extremities  Finger to nose was normal  An intermittent mild amplitude rest tremor is present in the right upper extremity which disappears with action and reemerges 1,0  There is no action tremor  Mouth tremor when walking  Mild bradykinesia right finger taps 1,0 , handgrips 1,0 rapid alternating movements 1,1  Normal heel and toe taps  There is no dystonia, or dyskinesia      Sensory  The patient's sensation is normal in all four extremities  Reflexes  Deep tendon reflexes are 2+ and symmetric in all four extremities with downgoing toes bilaterally  Gait and Coordination   He has a wide stance  Arose without using hands on the first attempt  Posture slightly stooped with right tilt  Good stride and arm swing  Wide based gait  No freezing seen  Reemergent resting tremor on the right with ambulation, tendency to hold the right hand slightly turned when walking  Did not take any steps with pull back testing  ROS:    Review of Systems   Constitutional: Negative  Negative for appetite change and fever  HENT: Negative  Negative for hearing loss, tinnitus, trouble swallowing and voice change  Eyes: Negative  Negative for photophobia and pain  Respiratory: Negative  Negative for shortness of breath  Cardiovascular: Negative  Negative for palpitations  Gastrointestinal: Negative  Negative for nausea and vomiting     Endocrine: Negative  Negative for cold intolerance and heat intolerance  Genitourinary: Negative  Negative for dysuria, frequency and urgency  Musculoskeletal: Negative  Negative for myalgias and neck pain  Skin: Negative  Negative for rash  Neurological: Positive for tremors  Negative for dizziness, seizures, syncope, facial asymmetry, speech difficulty, weakness, light-headedness, numbness and headaches  Hematological: Negative  Does not bruise/bleed easily  Psychiatric/Behavioral: Negative  Negative for confusion, hallucinations and sleep disturbance

## 2018-06-13 NOTE — ASSESSMENT & PLAN NOTE
Pt's exam is stable to improved from his last appt  No medication changes will be made at this time  He will continue carbidopa/levadopa 1 5 tabs every 3 hours and rasagiline 1mg daily  Should his symptoms worsen he will call the office  He will follow-up in 4 months with me and 8 months with Dr Mj Waller

## 2018-06-13 NOTE — PATIENT INSTRUCTIONS
Parkinson's disease ([facility] [address] )  Pt's exam is stable to improved from his last appt  No medication changes will be made at this time  He will continue carbidopa/levadopa 1 5 tabs every 3 hours and rasagiline 1mg daily  Should his symptoms worsen he will call the office  He will follow-up in 4 months with me and 8 months with Dr Amada Sanches

## 2018-07-05 DIAGNOSIS — C61 PROSTATE CANCER (HCC): Primary | ICD-10-CM

## 2018-07-14 DIAGNOSIS — G20 PARKINSON'S DISEASE (HCC): ICD-10-CM

## 2018-07-16 RX ORDER — RASAGILINE 1 MG/1
TABLET ORAL
Qty: 30 TABLET | Refills: 2 | Status: SHIPPED | OUTPATIENT
Start: 2018-07-16 | End: 2018-10-02 | Stop reason: SDUPTHER

## 2018-07-24 ENCOUNTER — OFFICE VISIT (OUTPATIENT)
Dept: UROLOGY | Facility: MEDICAL CENTER | Age: 78
End: 2018-07-24
Payer: COMMERCIAL

## 2018-07-24 VITALS
DIASTOLIC BLOOD PRESSURE: 68 MMHG | WEIGHT: 199 LBS | HEIGHT: 70 IN | SYSTOLIC BLOOD PRESSURE: 118 MMHG | BODY MASS INDEX: 28.49 KG/M2

## 2018-07-24 DIAGNOSIS — C61 PROSTATE CANCER (HCC): Primary | ICD-10-CM

## 2018-07-24 PROCEDURE — 99214 OFFICE O/P EST MOD 30 MIN: CPT | Performed by: UROLOGY

## 2018-07-24 NOTE — PROGRESS NOTES
Assessment/Plan:    Prostate cancer ([facility] [address] )  PSA is less than 0 1 on current regimen  He will return in 3 months for repeat Lupron  He will be following up with Oncology in August   They have ordered repeat blood work  Diagnoses and all orders for this visit:    Prostate cancer [facility])          Subjective:      Patient ID: Destini Wright is a 66 y o  male  68-year-old male with stage D2 prostate cancer  He receives Lupron at 6 month intervals in our office  He also sees [facility] oncology  Under there auspices he is receiving abiraterone, prednisone and Xgeva  He is doing well  He reports that hot flashes have improved with earlier dosing of the abiraterone  He is voiding adequately  His stream is good and he empties his bladder adequately  He does get up frequently at night  There is no gross hematuria, dysuria or symptoms of infection  He has no new back or bone pain  He is satisfied with his voiding pattern  He has noted some weight loss but notes that he has not been eating as much  The following portions of the patient's history were reviewed and updated as appropriate: allergies, current medications, past family history, past medical history, past social history, past surgical history and problem list     Review of Systems   Constitutional: Positive for unexpected weight change  Negative for chills, diaphoresis, fatigue and fever  HENT: Negative  Eyes: Negative  Respiratory: Negative  Cardiovascular: Negative  Endocrine: Negative  Hot flashes - improved recently   Musculoskeletal: Negative  Skin: Negative  Allergic/Immunologic: Negative  Neurological: Negative  Hematological: Negative  Psychiatric/Behavioral: Negative  Objective:      /68 (BP Location: Left arm, Patient Position: Sitting)   Ht 5' 10" (1 778 m)   Wt 90 3 kg (199 lb)   BMI 28 55 kg/m²          Physical Exam   Constitutional: He is oriented to person, place, and time   He appears well-developed and well-nourished  HENT:   Head: Normocephalic and atraumatic  Eyes: Conjunctivae are normal    Neck: Neck supple  Cardiovascular: Normal rate  Pulmonary/Chest: Effort normal    Abdominal: Soft  He exhibits no distension and no mass  There is no tenderness  There is no rebound and no guarding  No hernia   Lymphadenopathy:     He has no cervical adenopathy  He has no axillary adenopathy  Right: No inguinal adenopathy present  Left: No inguinal adenopathy present  Neurological: He is alert and oriented to person, place, and time  Skin: Skin is warm and dry  Psychiatric: He has a normal mood and affect  His behavior is normal  Judgment and thought content normal    Nursing note and vitals reviewed

## 2018-07-24 NOTE — ASSESSMENT & PLAN NOTE
PSA is less than 0 1 on current regimen  He will return in 3 months for repeat Lupron  He will be following up with Oncology in August   They have ordered repeat blood work

## 2018-07-24 NOTE — LETTER
July 24, 2018     Randel Jeans, [address] [facility]  [address] [place] [address]    Patient: Byron Ball   YOB: 1940   Date of Visit: 7/24/2018       Dear Dr Byron Abarca: Thank you for referring Byron Ball to me for evaluation  Below are my notes for this consultation  If you have questions, please do not hesitate to call me  I look forward to following your patient along with you  Sincerely,        Brit Martinez MD        CC: No Recipients  Brit Martinez MD  7/24/2018  9:53 AM  Sign at close encounter  Assessment/Plan:    No problem-specific Assessment & Plan notes found for this encounter  There are no diagnoses linked to this encounter  Subjective:      Patient ID: Byron Ball is a 66 y o  male      HPI    The following portions of the patient's history were reviewed and updated as appropriate: allergies, current medications, past family history, past medical history, past social history, past surgical history and problem list     Review of Systems      Objective:      /68 (BP Location: Left arm, Patient Position: Sitting)   Ht 5' 10" (1 778 m)   Wt 90 3 kg (199 lb)   BMI 28 55 kg/m²           Physical Exam

## 2018-07-24 NOTE — PROGRESS NOTES
IPSS Questionnaire (AUA-7): Over the past month    1)  How often have you had a sensation of not emptying your bladder completely after you finish urinating? 0 - Not at all   2)  How often have you had to urinate again less than two hours after you finished urinating? 3 - About half the time   3)  How often have you found you stopped and started again several times when you urinated? 0 - Not at all   4) How difficult have you found it to postpone urination? 1 - Less than 1 time in 5   5) How often have you had a weak urinary stream?  1 - Less than 1 time in 5   6) How often have you had to push or strain to begin urination? 0 - Not at all   7) How many times did you most typically get up to urinate from the time you went to bed until the time you got up in the morning?   3 - 3 times   Total Score:  8     QOL: Mixed

## 2018-10-02 DIAGNOSIS — G20 PARKINSON'S DISEASE (HCC): ICD-10-CM

## 2018-10-02 RX ORDER — RASAGILINE 1 MG/1
TABLET ORAL
Qty: 30 TABLET | Refills: 5 | Status: SHIPPED | OUTPATIENT
Start: 2018-10-02 | End: 2018-10-25 | Stop reason: SDUPTHER

## 2018-10-04 ENCOUNTER — CLINICAL SUPPORT (OUTPATIENT)
Dept: FAMILY MEDICINE CLINIC | Facility: CLINIC | Age: 78
End: 2018-10-04
Payer: COMMERCIAL

## 2018-10-04 DIAGNOSIS — Z23 NEED FOR INFLUENZA VACCINATION: Primary | ICD-10-CM

## 2018-10-04 PROCEDURE — G0008 ADMIN INFLUENZA VIRUS VAC: HCPCS

## 2018-10-04 PROCEDURE — 90662 IIV NO PRSV INCREASED AG IM: CPT

## 2018-10-04 NOTE — PROGRESS NOTES
Patient presents today for a high dose influenza vaccine 0 5 ML right deltoid intramuscularly  Patient tolerated well

## 2018-10-24 ENCOUNTER — OFFICE VISIT (OUTPATIENT)
Dept: UROLOGY | Facility: MEDICAL CENTER | Age: 78
End: 2018-10-24
Payer: COMMERCIAL

## 2018-10-24 VITALS
SYSTOLIC BLOOD PRESSURE: 122 MMHG | BODY MASS INDEX: 27.77 KG/M2 | WEIGHT: 194 LBS | HEIGHT: 70 IN | DIASTOLIC BLOOD PRESSURE: 70 MMHG

## 2018-10-24 DIAGNOSIS — C61 PROSTATE CANCER (HCC): Primary | ICD-10-CM

## 2018-10-24 DIAGNOSIS — C79.51 BONE METASTASES (HCC): ICD-10-CM

## 2018-10-24 LAB
SL AMB  POCT GLUCOSE, UA: ABNORMAL
SL AMB LEUKOCYTE ESTERASE,UA: ABNORMAL
SL AMB POCT BILIRUBIN,UA: ABNORMAL
SL AMB POCT BLOOD,UA: ABNORMAL
SL AMB POCT CLARITY,UA: CLEAR
SL AMB POCT COLOR,UA: YELLOW
SL AMB POCT KETONES,UA: 15
SL AMB POCT NITRITE,UA: ABNORMAL
SL AMB POCT PH,UA: 6
SL AMB POCT SPECIFIC GRAVITY,UA: 1.02
SL AMB POCT URINE PROTEIN: ABNORMAL
SL AMB POCT UROBILINOGEN: 0.2

## 2018-10-24 PROCEDURE — 81003 URINALYSIS AUTO W/O SCOPE: CPT | Performed by: UROLOGY

## 2018-10-24 PROCEDURE — 99214 OFFICE O/P EST MOD 30 MIN: CPT | Performed by: UROLOGY

## 2018-10-24 NOTE — ASSESSMENT & PLAN NOTE
His PSA was less than 0 01 in August   AUA symptom score is 7  Urinalysis is unremarkable  He is satisfied with his voiding pattern  We did discuss possibility of treating his nocturia but at this point he is content to pursue observation  He will be seeing oncology in the near future  He received a 6 month Depo injection of Lupron today  He will return in 6 months for repeat injection and follow-up

## 2018-10-24 NOTE — LETTER
October 24, 2018     Coni Henderson, [address] [place] [address]    Patient: Misha Pitt   YOB: 1940   Date of Visit: 10/24/2018       Dear Dr Momo Benjamin: Thank you for referring Misha Pitt to me for evaluation  Below are my notes for this consultation  If you have questions, please do not hesitate to call me  I look forward to following your patient along with you  Sincerely,        Mervat Pearl MD        CC: No Recipients  Mervat Pearl MD  10/24/2018  9:20 AM  Sign at close encounter  Assessment/Plan:    Prostate cancer [facility])  His PSA was less than 0 01 in August   AUA symptom score is 7  Urinalysis is unremarkable  He is satisfied with his voiding pattern  We did discuss possibility of treating his nocturia but at this point he is content to pursue observation  He will be seeing oncology in the near future  He received a 6 month Depo injection of Lupron today  He will return in 6 months for repeat injection and follow-up  Bone metastases ([facility] [address] )  He is presently asymptomatic  He remains on Depo injections of Lupron  He reports a bone schedule is scheduled in 2 weeks  He will be following up with Oncology after this  Diagnoses and all orders for this visit:    Prostate cancer ([facility] [address] )  -     POCT urine dip auto non-scope  -     leuprolide (LUPRON DEPOT 6 MONTH KIT) IM injection kit 45 mg; Inject 45 mg into a muscle once     Bone metastases ([facility])  -     leuprolide (LUPRON DEPOT 6 MONTH KIT) IM injection kit 45 mg; Inject 45 mg into a muscle once           Subjective:      Patient ID: Misha Pitt is a 66 y o  male  Chief complaint:  Prostate cancer    78-year-old male followed for stage D2 prostate cancer  He reports he is voiding adequately although he does get up frequently at night  His stream is adequate and he feels he empties well  He denies gross hematuria, dysuria or symptoms of infection  He has no flank pain    He has no new back or bone pain   He does relate low back discomfort with walking and activity but this resolves when he rests  His weight has been stable and he has no other constitutional symptoms  He does have Parkinson's disease and this is his primary source of morbidity  The following portions of the patient's history were reviewed and updated as appropriate: allergies, current medications, past family history, past medical history, past social history, past surgical history and problem list     Review of Systems   Constitutional: Negative for chills, diaphoresis, fatigue and fever  HENT: Negative  Eyes: Negative  Respiratory: Negative  Cardiovascular: Negative  Endocrine: Negative  Musculoskeletal: Positive for back pain  Skin: Negative  Allergic/Immunologic: Negative  Neurological: Negative  Parkinson's   Hematological: Negative  Psychiatric/Behavioral: Negative  AUA SYMPTOM SCORE      Most Recent Value   AUA SYMPTOM SCORE   How often have you had a sensation of not emptying your bladder completely after you finished urinating? 0   How often have you had to urinate again less than two hours after you finished urinating? 2   How often have you found you stopped and started again several times when you urinate?  0   How often have you found it difficult to postpone urination? 1   How often have you had a weak urinary stream?  0   How often have you had to push or strain to begin urination? 0   How many times did you most typically get up to urinate from the time you went to bed at night until the time you got up in the morning?   4   Quality of Life: If you were to spend the rest of your life with your urinary condition just the way it is now, how would you feel about that?  2   AUA SYMPTOM SCORE  7        Objective:      /70 (BP Location: Left arm, Patient Position: Sitting)   Ht 5' 10" (1 778 m)   Wt 88 kg (194 lb)   BMI 27 84 kg/m²           Physical Exam Constitutional: He is oriented to person, place, and time  He appears well-developed and well-nourished  HENT:   Head: Normocephalic and atraumatic  Eyes: Conjunctivae are normal    Neck: Neck supple  Cardiovascular: Normal rate  Pulmonary/Chest: Effort normal    Abdominal: Soft  Bowel sounds are normal  He exhibits no distension and no mass  There is no tenderness  There is no rebound, no guarding and no CVA tenderness  Umbilical hernia   Genitourinary: Rectum normal, testes normal and penis normal  Right testis shows no mass  Left testis shows no mass  No phimosis or hypospadias  Genitourinary Comments: Prostate small and free of induration   Musculoskeletal: He exhibits no edema  Lymphadenopathy:     He has no cervical adenopathy  He has no axillary adenopathy  Right: No inguinal adenopathy present  Left: No inguinal adenopathy present  Neurological: He is alert and oriented to person, place, and time  Skin: Skin is warm and dry  Psychiatric: He has a normal mood and affect   His behavior is normal  Judgment and thought content normal

## 2018-10-24 NOTE — PATIENT INSTRUCTIONS
Prostate Cancer, Ambulatory Care   GENERAL INFORMATION:   Prostate cancer, Ambulatory Care develops in the male sex gland that helps make semen (prostate)  It is about the size of a walnut and wraps around the urethra  The urethra is the tube that carries urine from the bladder to the end of the penis  In most cases, prostate cancer is slow growing  Common symptoms include the following:   · Trouble starting or stopping the flow of urine    · Feeling the need to urinate often, especially at night    · Pain or a burning feeling when you urinate or ejaculate semen    · Trouble having an erection    · Blood in your urine or semen    · Not being able to urinate at all    · Pain or stiffness in your lower back, hips, or upper thighs  Seek immediate care for the following symptoms:   · Chest pain when you take a deep breath or cough    · Coughing up blood    · Suddenly feeling lightheaded and short of breath    · Your leg feels warm, tender, and painful  It may look swollen and red  Treatment for prostate cancer: If you have early stage cancer, your healthcare provider may recommend that you have frequent tests and regular follow-up visits to watch for changes  You may also need any of the following:  · Hormone therapy  is medicine used to decrease testosterone (male hormone) levels  · Radiation therapy  is used to kill cancer cells with high-energy x-rays beams  You may receive radiation therapy from outside your body or from small beads or rods placed inside your prostate  · Surgery  may be needed, depending on the stage of the cancer  Part or all of your prostate may be removed  You may also need to have some lymph nodes taken out  This may help keep the cancer from spreading to other parts of your body  Manage your prostate cancer:   · Eat a variety of healthy foods  Healthy foods include fruits, vegetables, whole-grain breads, low-fat dairy products, beans, lean meats, and fish   Your healthcare provider may also recommend changes to the amounts of calcium and vitamin D you have each day  · Manage your weight  Obesity may increase your risk for problems from prostate cancer  Limit or do not have high-calorie foods or drinks  · Exercise as directed  Exercise may help you recover after treatment and may help prevent your prostate cancer from returning  Exercise can also help you manage your weight  Try to get at least 30 minutes of exercise 5 days a week, such as walking  · Do not smoke  If you smoke, it is never too late to quit  Smoking increases the risk that your prostate cancer will return after treatment  Smoking also increases your risk for other types of cancer  Ask your healthcare provider for information if you need help quitting  · Limit or do not drink alcohol  If you drink, limit alcohol to 2 drinks per day  A drink is 12 ounces of beer, 1½ ounces of liquor, or 5 ounces of wine  Follow up with your urologist or oncologist as directed:  Write down your questions so you remember to ask them during your visits  CARE AGREEMENT:   You have the right to help plan your care  Learn about your health condition and how it may be treated  Discuss treatment options with your caregivers to decide what care you want to receive  You always have the right to refuse treatment  The above information is an  only  It is not intended as medical advice for individual conditions or treatments  Talk to your doctor, nurse or pharmacist before following any medical regimen to see if it is safe and effective for you  © 2014 [address] is for End User's use only and may not be sold, redistributed or otherwise used for commercial purposes  All illustrations and images included in CareNotes® are the copyrighted property of A D A M , Inc  or Tavo Lopez

## 2018-10-24 NOTE — PROGRESS NOTES
Assessment/Plan:    [facility] )  His PSA was less than 0 01 in August   AUA symptom score is 7  Urinalysis is unremarkable  He is satisfied with his voiding pattern  We did discuss possibility of treating his nocturia but at this point he is content to pursue observation  He will be seeing oncology in the near future  He received a 6 month Depo injection of Lupron today  He will return in 6 months for repeat injection and follow-up  Bone metastases ([facility] )  He is presently asymptomatic  He remains on Depo injections of Lupron  He reports a bone schedule is scheduled in 2 weeks  He will be following up with Oncology after this  Diagnoses and all orders for this visit:    Prostate cancer ([facility] )  -     POCT urine dip auto non-scope  -     leuprolide (LUPRON DEPOT 6 MONTH KIT) IM injection kit 45 mg; Inject 45 mg into a muscle once     Bone metastases (HCC)  -     leuprolide (LUPRON DEPOT 6 MONTH KIT) IM injection kit 45 mg; Inject 45 mg into a muscle once           Subjective:      Patient ID: Jorgito Solis is a 66 y o  male  Chief complaint:  Prostate cancer    77-year-old male followed for stage D2 prostate cancer  He reports he is voiding adequately although he does get up frequently at night  His stream is adequate and he feels he empties well  He denies gross hematuria, dysuria or symptoms of infection  He has no flank pain  He has no new back or bone pain  He does relate low back discomfort with walking and activity but this resolves when he rests  His weight has been stable and he has no other constitutional symptoms  He does have Parkinson's disease and this is his primary source of morbidity          The following portions of the patient's history were reviewed and updated as appropriate: allergies, current medications, past family history, past medical history, past social history, past surgical history and problem list     Review of Systems   Constitutional: Negative for chills, diaphoresis, fatigue and fever  HENT: Negative  Eyes: Negative  Respiratory: Negative  Cardiovascular: Negative  Endocrine: Negative  Musculoskeletal: Positive for back pain  Skin: Negative  Allergic/Immunologic: Negative  Neurological: Negative  Parkinson's   Hematological: Negative  Psychiatric/Behavioral: Negative  AUA SYMPTOM SCORE      Most Recent Value   AUA SYMPTOM SCORE   How often have you had a sensation of not emptying your bladder completely after you finished urinating? 0   How often have you had to urinate again less than two hours after you finished urinating? 2   How often have you found you stopped and started again several times when you urinate?  0   How often have you found it difficult to postpone urination? 1   How often have you had a weak urinary stream?  0   How often have you had to push or strain to begin urination? 0   How many times did you most typically get up to urinate from the time you went to bed at night until the time you got up in the morning? 4   Quality of Life: If you were to spend the rest of your life with your urinary condition just the way it is now, how would you feel about that?  2   AUA SYMPTOM SCORE  7        Objective:      /70 (BP Location: Left arm, Patient Position: Sitting)   Ht 5' 10" (1 778 m)   Wt 88 kg (194 lb)   BMI 27 84 kg/m²          Physical Exam   Constitutional: He is oriented to person, place, and time  He appears well-developed and well-nourished  HENT:   Head: Normocephalic and atraumatic  Eyes: Conjunctivae are normal    Neck: Neck supple  Cardiovascular: Normal rate  Pulmonary/Chest: Effort normal    Abdominal: Soft  Bowel sounds are normal  He exhibits no distension and no mass  There is no tenderness  There is no rebound, no guarding and no CVA tenderness  Umbilical hernia   Genitourinary: Rectum normal, testes normal and penis normal  Right testis shows no mass   Left testis shows no mass  No phimosis or hypospadias  Genitourinary Comments: Prostate small and free of induration   Musculoskeletal: He exhibits no edema  Lymphadenopathy:     He has no cervical adenopathy  He has no axillary adenopathy  Right: No inguinal adenopathy present  Left: No inguinal adenopathy present  Neurological: He is alert and oriented to person, place, and time  Skin: Skin is warm and dry  Psychiatric: He has a normal mood and affect   His behavior is normal  Judgment and thought content normal

## 2018-10-24 NOTE — ASSESSMENT & PLAN NOTE
He is presently asymptomatic  He remains on Depo injections of Lupron  He reports a bone schedule is scheduled in 2 weeks  He will be following up with Oncology after this

## 2018-10-24 NOTE — LETTER
October 24, 2018     Doris Gross DO  [address] [facility]    Patient: Lizzie Miguel   YOB: 1940   Date of Visit: 10/24/2018       Dear Dr Any Tam: Thank you for referring Lizzie Miguel to me for evaluation  Below are my notes for this consultation  If you have questions, please do not hesitate to call me  I look forward to following your patient along with you  Sincerely,        Solis Leyva MD        CC: No Recipients  Solis Leyva MD  10/24/2018  9:20 AM  Sign at close encounter  Assessment/Plan:    Prostate cancer [facility])  His PSA was less than 0 01 in August   AUA symptom score is 7  Urinalysis is unremarkable  He is satisfied with his voiding pattern  We did discuss possibility of treating his nocturia but at this point he is content to pursue observation  He will be seeing oncology in the near future  He received a 6 month Depo injection of Lupron today  He will return in 6 months for repeat injection and follow-up  Bone metastases ([facility] [address] )  He is presently asymptomatic  He remains on Depo injections of Lupron  He reports a bone schedule is scheduled in 2 weeks  He will be following up with Oncology after this  Diagnoses and all orders for this visit:    Prostate cancer ([facility] [address] )  -     POCT urine dip auto non-scope  -     leuprolide (LUPRON DEPOT 6 MONTH KIT) IM injection kit 45 mg; Inject 45 mg into a muscle once     Bone metastases (HCC)  -     leuprolide (LUPRON DEPOT 6 MONTH KIT) IM injection kit 45 mg; Inject 45 mg into a muscle once           Subjective:      Patient ID: Lizzie Miguel is a 66 y o  male  Chief complaint:  Prostate cancer    68-year-old male followed for stage D2 prostate cancer  He reports he is voiding adequately although he does get up frequently at night  His stream is adequate and he feels he empties well  He denies gross hematuria, dysuria or symptoms of infection  He has no flank pain    He has no new back or bone pain  He does relate low back discomfort with walking and activity but this resolves when he rests  His weight has been stable and he has no other constitutional symptoms  He does have Parkinson's disease and this is his primary source of morbidity  The following portions of the patient's history were reviewed and updated as appropriate: allergies, current medications, past family history, past medical history, past social history, past surgical history and problem list     Review of Systems   Constitutional: Negative for chills, diaphoresis, fatigue and fever  HENT: Negative  Eyes: Negative  Respiratory: Negative  Cardiovascular: Negative  Endocrine: Negative  Musculoskeletal: Positive for back pain  Skin: Negative  Allergic/Immunologic: Negative  Neurological: Negative  Parkinson's   Hematological: Negative  Psychiatric/Behavioral: Negative  AUA SYMPTOM SCORE      Most Recent Value   AUA SYMPTOM SCORE   How often have you had a sensation of not emptying your bladder completely after you finished urinating? 0   How often have you had to urinate again less than two hours after you finished urinating? 2   How often have you found you stopped and started again several times when you urinate?  0   How often have you found it difficult to postpone urination? 1   How often have you had a weak urinary stream?  0   How often have you had to push or strain to begin urination? 0   How many times did you most typically get up to urinate from the time you went to bed at night until the time you got up in the morning?   4   Quality of Life: If you were to spend the rest of your life with your urinary condition just the way it is now, how would you feel about that?  2   AUA SYMPTOM SCORE  7        Objective:      /70 (BP Location: Left arm, Patient Position: Sitting)   Ht 5' 10" (1 778 m)   Wt 88 kg (194 lb)   BMI 27 84 kg/m²           Physical Exam   Constitutional: He is oriented to person, place, and time  He appears well-developed and well-nourished  HENT:   Head: Normocephalic and atraumatic  Eyes: Conjunctivae are normal    Neck: Neck supple  Cardiovascular: Normal rate  Pulmonary/Chest: Effort normal    Abdominal: Soft  Bowel sounds are normal  He exhibits no distension and no mass  There is no tenderness  There is no rebound, no guarding and no CVA tenderness  Umbilical hernia   Genitourinary: Rectum normal, testes normal and penis normal  Right testis shows no mass  Left testis shows no mass  No phimosis or hypospadias  Genitourinary Comments: Prostate small and free of induration   Musculoskeletal: He exhibits no edema  Lymphadenopathy:     He has no cervical adenopathy  He has no axillary adenopathy  Right: No inguinal adenopathy present  Left: No inguinal adenopathy present  Neurological: He is alert and oriented to person, place, and time  Skin: Skin is warm and dry  Psychiatric: He has a normal mood and affect   His behavior is normal  Judgment and thought content normal

## 2018-10-25 ENCOUNTER — OFFICE VISIT (OUTPATIENT)
Dept: NEUROLOGY | Facility: CLINIC | Age: 78
End: 2018-10-25
Payer: COMMERCIAL

## 2018-10-25 VITALS
SYSTOLIC BLOOD PRESSURE: 118 MMHG | HEART RATE: 82 BPM | BODY MASS INDEX: 27.77 KG/M2 | HEIGHT: 70 IN | DIASTOLIC BLOOD PRESSURE: 60 MMHG | WEIGHT: 194 LBS

## 2018-10-25 DIAGNOSIS — G20 PARKINSON'S DISEASE (HCC): ICD-10-CM

## 2018-10-25 DIAGNOSIS — G20 PARKINSON DISEASE (HCC): Primary | ICD-10-CM

## 2018-10-25 PROCEDURE — 4040F PNEUMOC VAC/ADMIN/RCVD: CPT | Performed by: PHYSICIAN ASSISTANT

## 2018-10-25 PROCEDURE — 99214 OFFICE O/P EST MOD 30 MIN: CPT | Performed by: PHYSICIAN ASSISTANT

## 2018-10-25 RX ORDER — CARBIDOPA AND LEVODOPA 50; 200 MG/1; MG/1
1 TABLET, EXTENDED RELEASE ORAL
Qty: 30 TABLET | Refills: 4 | Status: SHIPPED | OUTPATIENT
Start: 2018-10-25 | End: 2019-04-18 | Stop reason: SINTOL

## 2018-10-25 RX ORDER — RASAGILINE 1 MG/1
1 TABLET ORAL DAILY
Qty: 90 TABLET | Refills: 3 | Status: SHIPPED | OUTPATIENT
Start: 2018-10-25 | End: 2019-08-29 | Stop reason: SDUPTHER

## 2018-10-25 NOTE — PROGRESS NOTES
Patient ID: Alen Moore is a 66 y o  male  Assessment/Plan:    Parkinson's disease ([place] [address] )  Pt will continue carbidopa/levadopa and rasagiline at the present dosage  Sinemet CR 50/200 1 tab at bedtime will be added to help with his walking when he wakes up to use the bathroom at night  He will also be sent to PT to determine which walker will be the best for him to use  He will follow-up in 4 months  Problem List Items Addressed This Visit     Parkinson's disease ([place] [address] )     Pt will continue carbidopa/levadopa and rasagiline at the present dosage  Sinemet CR 50/200 1 tab at bedtime will be added to help with his walking when he wakes up to use the bathroom at night  He will also be sent to PT to determine which walker will be the best for him to use  He will follow-up in 4 months  Relevant Medications    carbidopa-levodopa (SINEMET CR)  mg per tablet    carbidopa-levodopa (SINEMET)  mg per tablet    rasagiline (AZILECT) 1 MG      Other Visit Diagnoses     Parkinson disease ([place] [address] )    -  Primary    Relevant Medications    carbidopa-levodopa (SINEMET CR)  mg per tablet    carbidopa-levodopa (SINEMET)  mg per tablet    rasagiline (AZILECT) 1 MG    Other Relevant Orders    Ambulatory referral to Physical Therapy             Subjective:    HPI    Alen Moore is a 68year old male  with prostate cancer on hormonal treatment and Parkinson's disease who presents for follow up  To review, symptoms began with right hand rest tremor and decrease in fine motor movements which progressed to include right lower extremity tremor and slowness in movements  He was diagnosed August 2010 and started on Sinemet 25/100 tid and Azilect  Previous trial of ropinirole ER was associated with side effects  Sari Blanchard was not covered      He remains on Sinemet 1 5tabs b5irbic (6am, 9am, 12pm, 3pm, 6pm) and Azilect 1mg daily   He will take extra doses of carbidopa/levadopa if he wakes up at night to use the bathroom  He is still freezing more often later in the day when he is tired  It is worse when he goes through doorways and is around groups of people  His wife reports that he will freeze when walking into one room from another or when carrying something  He does walk on the treadmill and outside  Elias Samuel has also set up a maze for himself at home which he does use  He did have one fall in July  He fell in the kitchen but did not sustain any injury  He is able to perform all of his ADLs well  He dresses and showers without issues  He is still driving and denies any issues  His wife is always in the car with him and she feels that he is still driving well  His memory is still very good  He reports improvement in appetite from the last appt  He denies any issues with swallowing  He finds that his tremors improve when he takes his dose of medication  He does report vivid dreams but no hallucinations  The following portions of the patient's history were reviewed and updated as appropriate: allergies, current medications, past family history, past medical history, past social history, past surgical history and problem list          Objective:    Blood pressure 118/60, pulse 82, height 5' 10" (1 778 m), weight 88 kg (194 lb)  Physical Exam   Eyes: Pupils are equal, round, and reactive to light  Lids are normal    Neurological: He is alert  He has normal strength and normal reflexes  Coordination normal    Psychiatric: His speech is normal    Vitals reviewed  Neurological Exam  Mental Status  Awake and alert  Oriented to person, place, time and situation  Recent and remote memory are intact  Speech is normal  Language is fluent with no aphasia  Attention and concentration are normal  Fund of knowledge is appropriate for level of education  Apraxia absent  Cranial Nerves  CN II: Visual acuity is normal  Visual fields full to confrontation  CN III, IV, VI: Extraocular movements intact bilaterally  Normal lids and orbits bilaterally  Pupils equal round and reactive to light bilaterally  CN V: Facial sensation is normal   CN VII: Full and symmetric facial movement  CN VIII: Hearing is normal   CN IX, X: Palate elevates symmetrically  Normal gag reflex  CN XI: Shoulder shrug strength is normal   CN XII: Tongue midline without atrophy or fasciculations  Motor   Strength is 5/5 throughout all four extremities  Finger to nose was normal  An intermittent mild amplitude rest tremor is present in the right upper extremity which disappears with action and reemerges 1,0  There is no action tremor  Mouth tremor when walking  Mild bradykinesia right finger taps 1,0 , handgrips 1,0 rapid alternating movements 1,1  Normal heel and toe taps  There is no dystonia, or dyskinesia    Sensory  Sensation is intact to light touch, pinprick, vibration and proprioception in all four extremities  Reflexes  Deep tendon reflexes are 2+ and symmetric in all four extremities with downgoing toes bilaterally  Coordination  Finger-to-nose, rapid alternating movements and heel-to-shin normal bilaterally without dysmetria  Gait  Arose without using hands on the first attempt  Posture slightly stooped with right tilt  Good stride and arm swing  Wide based gait  No freezing seen  Reemergent resting tremor on the right with ambulation, tendency to hold the right hand slightly turned when walking  Did not take any steps with pull back testing           ROS:    Review of Systems   Constitutional: Positive for fatigue  HENT: Negative  Eyes: Negative  Respiratory: Negative  Cardiovascular: Negative  Gastrointestinal: Negative  Endocrine: Negative  Genitourinary: Negative  Musculoskeletal: Negative  Skin: Negative  Allergic/Immunologic: Negative  Neurological: Negative  Hematological: Negative  Psychiatric/Behavioral: Negative

## 2018-10-25 NOTE — ASSESSMENT & PLAN NOTE
Pt will continue carbidopa/levadopa and rasagiline at the present dosage  Sinemet CR 50/200 1 tab at bedtime will be added to help with his walking when he wakes up to use the bathroom at night  He will also be sent to PT to determine which walker will be the best for him to use  He will follow-up in 4 months

## 2018-10-25 NOTE — PATIENT INSTRUCTIONS
Parkinson's disease ([facility] [address] )  Pt will continue carbidopa/levadopa and rasagiline at the present dosage  Sinemet CR 50/200 1 tab at bedtime will be added to help with his walking when he wakes up to use the bathroom at night  He will also be sent to PT to determine which walker will be the best for him to use  He will follow-up in 4 months

## 2018-12-04 ENCOUNTER — TELEPHONE (OUTPATIENT)
Dept: NEUROLOGY | Facility: CLINIC | Age: 78
End: 2018-12-04

## 2018-12-04 NOTE — TELEPHONE ENCOUNTER
Patient's wife states the recent refill of sinemet is a new pill (pill is light yellow and round, it has a "loop" on the one side and other side has 539)    She states that ever since they received sinemet rx refill  he feels more nervous and does not feel like it helps with the shakiness  Patient had an abscess tooth and was on an antibiotic, finished this today  Denies any other sxs  No changes in meds  Sinemet  5 times daily  Sinemet CR (she states he takes PRN)  azilect 1 mg daily      Advised the patient's wife to call the pharmacy as well, not sure if they can do anything about the change in   Please advise

## 2018-12-05 NOTE — TELEPHONE ENCOUNTER
Patient's wife stated she called the pharmacy and they informed her it was the same med, no change in   She states the patient is feeling a little better today, she thinks that maybe the antibiotic that he was on was causing his sxs  I advised her to call us if he would continue to have worsening sxs, she verbalized understanding

## 2019-04-18 ENCOUNTER — OFFICE VISIT (OUTPATIENT)
Dept: NEUROLOGY | Facility: CLINIC | Age: 79
End: 2019-04-18
Payer: COMMERCIAL

## 2019-04-18 ENCOUNTER — TELEPHONE (OUTPATIENT)
Dept: PALLIATIVE MEDICINE | Facility: CLINIC | Age: 79
End: 2019-04-18

## 2019-04-18 VITALS
HEART RATE: 82 BPM | DIASTOLIC BLOOD PRESSURE: 70 MMHG | SYSTOLIC BLOOD PRESSURE: 118 MMHG | HEIGHT: 70 IN | WEIGHT: 195 LBS | BODY MASS INDEX: 27.92 KG/M2

## 2019-04-18 DIAGNOSIS — G20 PARKINSON DISEASE (HCC): Primary | ICD-10-CM

## 2019-04-18 PROCEDURE — 99214 OFFICE O/P EST MOD 30 MIN: CPT | Performed by: PHYSICIAN ASSISTANT

## 2019-04-19 ENCOUNTER — OFFICE VISIT (OUTPATIENT)
Dept: UROLOGY | Facility: MEDICAL CENTER | Age: 79
End: 2019-04-19
Payer: COMMERCIAL

## 2019-04-19 VITALS
HEART RATE: 64 BPM | WEIGHT: 195 LBS | BODY MASS INDEX: 27.92 KG/M2 | HEIGHT: 70 IN | SYSTOLIC BLOOD PRESSURE: 130 MMHG | DIASTOLIC BLOOD PRESSURE: 84 MMHG

## 2019-04-19 DIAGNOSIS — C61 PROSTATE CANCER (HCC): Primary | ICD-10-CM

## 2019-04-19 LAB
SL AMB  POCT GLUCOSE, UA: NEGATIVE
SL AMB LEUKOCYTE ESTERASE,UA: NEGATIVE
SL AMB POCT BILIRUBIN,UA: NORMAL
SL AMB POCT BLOOD,UA: NEGATIVE
SL AMB POCT CLARITY,UA: CLEAR
SL AMB POCT COLOR,UA: NORMAL
SL AMB POCT KETONES,UA: NORMAL
SL AMB POCT NITRITE,UA: NEGATIVE
SL AMB POCT PH,UA: 6
SL AMB POCT SPECIFIC GRAVITY,UA: >=1.03
SL AMB POCT URINE PROTEIN: NORMAL
SL AMB POCT UROBILINOGEN: 1

## 2019-04-19 PROCEDURE — 99214 OFFICE O/P EST MOD 30 MIN: CPT

## 2019-04-19 PROCEDURE — 81003 URINALYSIS AUTO W/O SCOPE: CPT

## 2019-04-19 RX ORDER — TAMSULOSIN HYDROCHLORIDE 0.4 MG/1
0.4 CAPSULE ORAL
Qty: 90 CAPSULE | Refills: 3 | Status: SHIPPED | OUTPATIENT
Start: 2019-04-19 | End: 2019-05-31

## 2019-05-02 ENCOUNTER — TELEPHONE (OUTPATIENT)
Dept: FAMILY MEDICINE CLINIC | Facility: CLINIC | Age: 79
End: 2019-05-02

## 2019-05-24 ENCOUNTER — TELEPHONE (OUTPATIENT)
Dept: NEUROLOGY | Facility: CLINIC | Age: 79
End: 2019-05-24

## 2019-05-24 DIAGNOSIS — G20 PARKINSON'S DISEASE (HCC): ICD-10-CM

## 2019-05-24 RX ORDER — CARBIDOPA AND LEVODOPA 50; 200 MG/1; MG/1
1 TABLET, EXTENDED RELEASE ORAL
Qty: 30 TABLET | Refills: 0 | Status: SHIPPED | OUTPATIENT
Start: 2019-05-24 | End: 2019-05-30 | Stop reason: SINTOL

## 2019-05-30 ENCOUNTER — OFFICE VISIT (OUTPATIENT)
Dept: NEUROLOGY | Facility: CLINIC | Age: 79
End: 2019-05-30
Payer: COMMERCIAL

## 2019-05-30 VITALS
HEART RATE: 86 BPM | WEIGHT: 194 LBS | HEIGHT: 70 IN | SYSTOLIC BLOOD PRESSURE: 142 MMHG | DIASTOLIC BLOOD PRESSURE: 80 MMHG | BODY MASS INDEX: 27.77 KG/M2

## 2019-05-30 DIAGNOSIS — E53.8 LOW VITAMIN B12 LEVEL: ICD-10-CM

## 2019-05-30 DIAGNOSIS — R53.83 OTHER FATIGUE: ICD-10-CM

## 2019-05-30 DIAGNOSIS — G20 PARKINSON'S DISEASE (HCC): Primary | ICD-10-CM

## 2019-05-30 DIAGNOSIS — R79.89 LOW VITAMIN D LEVEL: ICD-10-CM

## 2019-05-30 PROCEDURE — 99215 OFFICE O/P EST HI 40 MIN: CPT | Performed by: PHYSICIAN ASSISTANT

## 2019-05-31 ENCOUNTER — TELEPHONE (OUTPATIENT)
Dept: NEUROLOGY | Facility: CLINIC | Age: 79
End: 2019-05-31

## 2019-05-31 ENCOUNTER — OFFICE VISIT (OUTPATIENT)
Dept: FAMILY MEDICINE CLINIC | Facility: CLINIC | Age: 79
End: 2019-05-31
Payer: COMMERCIAL

## 2019-05-31 VITALS
HEART RATE: 64 BPM | DIASTOLIC BLOOD PRESSURE: 74 MMHG | BODY MASS INDEX: 27.92 KG/M2 | WEIGHT: 195 LBS | RESPIRATION RATE: 16 BRPM | SYSTOLIC BLOOD PRESSURE: 120 MMHG | HEIGHT: 70 IN | TEMPERATURE: 98.5 F

## 2019-05-31 DIAGNOSIS — C61 PROSTATE CANCER (HCC): ICD-10-CM

## 2019-05-31 DIAGNOSIS — I25.10 ARTERIOSCLEROTIC CORONARY ARTERY DISEASE: ICD-10-CM

## 2019-05-31 DIAGNOSIS — Z00.00 MEDICARE ANNUAL WELLNESS VISIT, SUBSEQUENT: ICD-10-CM

## 2019-05-31 DIAGNOSIS — G20 PARKINSON'S DISEASE (HCC): ICD-10-CM

## 2019-05-31 DIAGNOSIS — R79.89 LOW VITAMIN D LEVEL: ICD-10-CM

## 2019-05-31 DIAGNOSIS — E78.5 HYPERLIPIDEMIA, UNSPECIFIED HYPERLIPIDEMIA TYPE: ICD-10-CM

## 2019-05-31 DIAGNOSIS — I10 BENIGN ESSENTIAL HYPERTENSION: Primary | ICD-10-CM

## 2019-05-31 PROBLEM — T50.905A HOT FLASH DUE TO MEDICATION: Status: ACTIVE | Noted: 2018-08-14

## 2019-05-31 PROBLEM — R23.2 HOT FLASH DUE TO MEDICATION: Status: ACTIVE | Noted: 2018-08-14

## 2019-05-31 PROCEDURE — 3074F SYST BP LT 130 MM HG: CPT | Performed by: FAMILY MEDICINE

## 2019-05-31 PROCEDURE — 1170F FXNL STATUS ASSESSED: CPT | Performed by: FAMILY MEDICINE

## 2019-05-31 PROCEDURE — 3078F DIAST BP <80 MM HG: CPT | Performed by: FAMILY MEDICINE

## 2019-05-31 PROCEDURE — 99214 OFFICE O/P EST MOD 30 MIN: CPT | Performed by: FAMILY MEDICINE

## 2019-05-31 PROCEDURE — 1125F AMNT PAIN NOTED PAIN PRSNT: CPT | Performed by: FAMILY MEDICINE

## 2019-05-31 PROCEDURE — G0439 PPPS, SUBSEQ VISIT: HCPCS | Performed by: FAMILY MEDICINE

## 2019-05-31 NOTE — TELEPHONE ENCOUNTER
Called pt to set up an appt with Dr Thompson for his next appt due to not being seen by Paul Jimenez in a long time  I asked if they could please give a call back to set up an appt

## 2019-06-22 DIAGNOSIS — G20 PARKINSON'S DISEASE (HCC): ICD-10-CM

## 2019-06-25 RX ORDER — CARBIDOPA AND LEVODOPA 50; 200 MG/1; MG/1
TABLET, EXTENDED RELEASE ORAL
Qty: 30 TABLET | Refills: 3 | Status: SHIPPED | OUTPATIENT
Start: 2019-06-25 | End: 2019-08-29 | Stop reason: SINTOL

## 2019-06-27 ENCOUNTER — OFFICE VISIT (OUTPATIENT)
Dept: NEUROLOGY | Facility: CLINIC | Age: 79
End: 2019-06-27
Payer: COMMERCIAL

## 2019-06-27 VITALS
SYSTOLIC BLOOD PRESSURE: 120 MMHG | DIASTOLIC BLOOD PRESSURE: 82 MMHG | HEART RATE: 68 BPM | HEIGHT: 70 IN | BODY MASS INDEX: 28.06 KG/M2 | WEIGHT: 196 LBS

## 2019-06-27 DIAGNOSIS — G20 PARKINSON'S DISEASE (HCC): Primary | ICD-10-CM

## 2019-06-27 PROCEDURE — 99214 OFFICE O/P EST MOD 30 MIN: CPT | Performed by: PSYCHIATRY & NEUROLOGY

## 2019-07-05 ENCOUNTER — TELEPHONE (OUTPATIENT)
Dept: FAMILY MEDICINE CLINIC | Facility: CLINIC | Age: 79
End: 2019-07-05

## 2019-07-05 NOTE — TELEPHONE ENCOUNTER
Monae Acuña called and stated Christopher Hicks is having more mobility problems when walking a distance  She would like to know if a script for a transport chair can be provided for him    Please advise  Thank you

## 2019-07-07 DIAGNOSIS — R26.2 AMBULATORY DYSFUNCTION: ICD-10-CM

## 2019-07-07 DIAGNOSIS — G20 PARKINSON'S DISEASE (HCC): Primary | ICD-10-CM

## 2019-07-18 ENCOUNTER — OFFICE VISIT (OUTPATIENT)
Dept: FAMILY MEDICINE CLINIC | Facility: CLINIC | Age: 79
End: 2019-07-18
Payer: COMMERCIAL

## 2019-07-18 VITALS
BODY MASS INDEX: 29.7 KG/M2 | HEIGHT: 68 IN | WEIGHT: 196 LBS | TEMPERATURE: 98.7 F | DIASTOLIC BLOOD PRESSURE: 74 MMHG | HEART RATE: 65 BPM | OXYGEN SATURATION: 97 % | SYSTOLIC BLOOD PRESSURE: 144 MMHG

## 2019-07-18 DIAGNOSIS — L98.429 SKIN ULCER OF BACK (HCC): Primary | ICD-10-CM

## 2019-07-18 PROCEDURE — 99213 OFFICE O/P EST LOW 20 MIN: CPT | Performed by: FAMILY MEDICINE

## 2019-07-18 PROCEDURE — 1160F RVW MEDS BY RX/DR IN RCRD: CPT | Performed by: FAMILY MEDICINE

## 2019-07-18 NOTE — PROGRESS NOTES
Assessment/Plan:  Patient will be started on Bactroban ointment to apply t i d  Keep area clean and dry  Patient is being referred to Dr Sima Umanzor, dermatologist further evaluation and treatment of skin ulcers and skin lesions  Return the office lawson Hedrick Diagnoses and all orders for this visit:    Skin ulcer of back ([facility] [address] )  Comments:  Bactroban ointment apply t i d  Referred to Dermatology  Orders:  -     mupirocin (BACTROBAN) 2 % ointment; Apply topically 3 (three) times a day  -     Ambulatory referral to Dermatology; Future          Subjective:      Patient ID: Pam Dawkins is a 78 y o  male  Patient complains of 2 sores on his back for the past 1 month  He admits to occasional drainage and bleeding  He denies fever, chills or sweats  He has been treating this with triple antibiotic ointment without significant improvement  The following portions of the patient's history were reviewed and updated as appropriate: allergies, current medications, past family history, past medical history, past social history, past surgical history and problem list     Review of Systems      Objective:      /74 (BP Location: Left arm, Patient Position: Sitting, Cuff Size: Large)   Pulse 65   Temp 98 7 °F (37 1 °C) (Tympanic)   Ht 5' 8" (1 727 m)   Wt 88 9 kg (196 lb)   SpO2 97%   BMI 29 80 kg/m²          Physical Exam   Constitutional: He is oriented to person, place, and time  He appears well-developed and well-nourished  No distress  HENT:   Head: Normocephalic  Mouth/Throat: Oropharynx is clear and moist    Eyes: Conjunctivae are normal  No scleral icterus  Neck: Neck supple  Cardiovascular: Normal rate and regular rhythm  Pulmonary/Chest: Effort normal and breath sounds normal    Abdominal: Soft  There is no tenderness  Musculoskeletal: He exhibits no edema  Lymphadenopathy:     He has no cervical adenopathy  Neurological: He is alert and oriented to person, place, and time     Skin: Skin is warm and dry  Two small superficial skin ulcerations on back  Negative erythema, negative swelling, negative purulent drainage and nontender  Several scattered nevi and keratoses  Psychiatric: He has a normal mood and affect

## 2019-07-30 ENCOUNTER — TELEPHONE (OUTPATIENT)
Dept: FAMILY MEDICINE CLINIC | Facility: CLINIC | Age: 79
End: 2019-07-30

## 2019-07-30 NOTE — TELEPHONE ENCOUNTER
Left message for Chris Alfaro to call back  We need Group NPI and DX Codes to completed pt referral for upcoming appt on 08/05/2019

## 2019-08-29 ENCOUNTER — OFFICE VISIT (OUTPATIENT)
Dept: NEUROLOGY | Facility: CLINIC | Age: 79
End: 2019-08-29
Payer: COMMERCIAL

## 2019-08-29 VITALS
SYSTOLIC BLOOD PRESSURE: 129 MMHG | HEART RATE: 59 BPM | DIASTOLIC BLOOD PRESSURE: 60 MMHG | BODY MASS INDEX: 29.4 KG/M2 | WEIGHT: 194 LBS | HEIGHT: 68 IN

## 2019-08-29 DIAGNOSIS — G20 PARKINSON'S DISEASE (HCC): ICD-10-CM

## 2019-08-29 DIAGNOSIS — G20 PARKINSON DISEASE (HCC): Primary | ICD-10-CM

## 2019-08-29 PROCEDURE — 99214 OFFICE O/P EST MOD 30 MIN: CPT | Performed by: PHYSICIAN ASSISTANT

## 2019-08-29 RX ORDER — RASAGILINE 1 MG/1
1 TABLET ORAL DAILY
Qty: 90 TABLET | Refills: 3 | Status: SHIPPED | OUTPATIENT
Start: 2019-08-29 | End: 2019-12-30 | Stop reason: SDUPTHER

## 2019-08-29 NOTE — ASSESSMENT & PLAN NOTE
Exam reveals overall stability of symptoms as well as the report of the pt and his wife  He will continue carbidopa levadopa as prescribed  He will continue Azilect as well  He was encouraged to get regular exercise  Twenty five minutes were spent with the patient gathering history, examining patient and formulating a treatment plan  Pt/family understood and were in agreement with the treatment plan  He will follow-up as scheduled with Dr Ramin Gonzalez

## 2019-08-29 NOTE — PROGRESS NOTES
Patient ID: Harjit Aburto is a 78 y o  male  Assessment/Plan:    No problem-specific Assessment & Plan notes found for this encounter  [id number]}       Subjective:    HPI    Harjit Aburto is a 78year old male  with prostate cancer on hormonal treatment and Parkinson's disease who presents for follow up  To review, symptoms began with right hand rest tremor and decrease in fine motor movements which progressed to include right lower extremity tremor and slowness in movements  He was diagnosed August 2010 and started on Sinemet 25/100 tid and Azilect  Previous trial of ropinirole ER was associated with side effects  Ruth Umana was not covered  He remains on Sinemet 1 5tabs c1ivvak (6am, 9am, 12pm, 3pm, 6pm) and Azilect 1mg daily  He reports that he is waking up about 4x per night to urinate and will take a tablet of carbidopa levadopa each time he wakes up  He will also take extra medication when he is traveling  He ran out of medication prior to the next refill because he was taking the extra medication  He did try Sinemet CR 50/200mg 1 tab at bedtime  He reports that when he took it he had tingling of his lips and tongue  He goes to bed by 8PM  He reports that he always feels fatigued  He is still freezing but less so than in the past  It is worse when he goes through doorways and is around groups of people  His wife reports that he will freeze when walking into one room from another or when carrying something  He does walk on the treadmill and outside  He has not had any falls since his last appt  He does have some assistance with getting a jacket on or putting his socks on but otherwise is able to do his own ADLs  He showers without issues  He is still driving and denies any issues  His wife is always in the car with him and she feels that he is still driving well  His memory is still very good  He reports improvement in appetite from the last appt   He denies any issues with swallowing  He finds that his tremors improve when he takes his dose of medication  He does report vivid dreams which are possibly hallucinations  His wife is not sure  He has not had them in a few months  The following portions of the patient's history were reviewed and updated as appropriate: allergies, current medications, past family history, past medical history, past social history, past surgical history and problem list          Objective:    Blood pressure 129/60, pulse 59, height 5' 8" (1 727 m), weight 88 kg (194 lb)      Physical Exam    Neurological Exam      ROS:    Review of Systems

## 2019-08-29 NOTE — PROGRESS NOTES
Patient ID: Volodymyr Richey is a 78 y o  male  Assessment/Plan:    Parkinson's disease ([facility] )  Exam reveals overall stability of symptoms as well as the report of the pt and his wife  He will continue carbidopa levadopa as prescribed  He will continue Azilect as well  He was encouraged to get regular exercise  Twenty five minutes were spent with the patient gathering history, examining patient and formulating a treatment plan  Pt/family understood and were in agreement with the treatment plan  He will follow-up as scheduled with Dr Perla Henry  Problem List Items Addressed This Visit        Nervous and Auditory    Parkinson's disease [facility])     Exam reveals overall stability of symptoms as well as the report of the pt and his wife  He will continue carbidopa levadopa as prescribed  He will continue Azilect as well  He was encouraged to get regular exercise  Twenty five minutes were spent with the patient gathering history, examining patient and formulating a treatment plan  Pt/family understood and were in agreement with the treatment plan  He will follow-up as scheduled with Dr Perla Henry  Relevant Medications    rasagiline (AZILECT) 1 MG      Other Visit Diagnoses     Parkinson disease ([place] [address] )    -  Primary    Relevant Medications    rasagiline (AZILECT) 1 MG             Subjective:    HPI    Volodymyr Richey is a 78year old male  with prostate cancer on hormonal treatment and Parkinson's disease who presents for follow up  To review, symptoms began with right hand rest tremor and decrease in fine motor movements which progressed to include right lower extremity tremor and slowness in movements  He was diagnosed August 2010 and started on Sinemet 25/100 tid and Azilect  Previous trial of ropinirole ER was associated with side effects  Warren Segundo was not covered  He remains on Sinemet 1 5tabs b7undyz (6am, 9am, 12pm, 6pm and 2 tabs at 3PM) and Azilect 1mg daily   At his last appt he reported waking up about 4x per night to urinate and would take a tablet of carbidopa levadopa each time he wakes up  He also took extra medication when he is traveling  He ran out of medication prior to the next refill because he was taking the extra medication  Sinemet CR 50/200mg 1 tab at bedtime was trialed but he did not like how he felt when he took it  He reports that when he took it he felt tense and anxious  He is now taking Sinemet 25/100 1 tab at 9PM, 12 AM and 3PM  He reports that he always feels fatigued which is due to [place]  He has not fallen since his last appt  He is still freezing but less so than in the past  It is worse when he goes through doorways and is around groups of people  His wife reports that he will freeze when walking into one room from another or when carrying something  He does walk outside  He does have some assistance with getting a jacket on or putting his socks on but otherwise is able to do his own ADLs  He showers without issues  He is still driving and denies any issues  His wife is always in the car with him and she feels that he is still driving well  His memory is still very good  He reports improvement in appetite from the last appt  He denies any issues with swallowing  He finds that his tremors improve when he takes his dose of medication  He does report vivid dreams which are possibly hallucinations  His wife is not sure  He has not had them in a few months  The following portions of the patient's history were reviewed and updated as appropriate: allergies, current medications, past family history, past medical history, past social history, past surgical history and problem list          Objective:    Blood pressure 129/60, pulse 59, height 5' 8" (1 727 m), weight 88 kg (194 lb)  Physical Exam   Eyes: Pupils are equal, round, and reactive to light  Lids are normal    Neurological: He has normal reflexes  Psychiatric: His speech is normal    Vitals reviewed        Neurological Exam  Mental Status   Oriented to person, place, time and situation  Speech is normal  Language is fluent with no aphasia  Cranial Nerves  CN II: Visual acuity is normal  Visual fields full to confrontation  CN III, IV, VI: Extraocular movements intact bilaterally  Normal lids and orbits bilaterally  Pupils equal round and reactive to light bilaterally  CN V: Facial sensation is normal   CN VII: Full and symmetric facial movement  CN VIII: Hearing is normal   CN IX, X: Palate elevates symmetrically  Normal gag reflex  CN XI: Shoulder shrug strength is normal   CN XII: Tongue midline without atrophy or fasciculations  Motor    An intermittent mild amplitude rest tremor is present in the right upper extremity which disappears with action and reemerges 1,0  Mouth tremor when walking  Mild bradykinesia right finger taps 1,0 , handgrips 1,0 rapid alternating movements 1,1  HT 0, 1  TT 0, 1  There is no dystonia, or dyskinesia  Sensory  Sensation is intact to light touch, pinprick, vibration and proprioception in all four extremities  Reflexes  Deep tendon reflexes are 2+ and symmetric in all four extremities with downgoing toes bilaterally  Coordination  Right: Rapid alternating movement abnormality:  Left: Rapid alternating movement abnormality:  See motor exam     Gait  Pt did have to use his hands to get up from the chair, he is due for Sinemet at the time of the exam  Posture slightly stooped with right tilt  Good stride and arm swing  Wide based gait  No freezing seen  Reemergent resting tremor bilat with ambulation, tendency to hold the right hand slightly turned when walking   Did not take any steps with pull back testing  ROS:    Review of Systems   Constitutional: Positive for fatigue  Negative for appetite change and fever  HENT: Negative  Negative for hearing loss, tinnitus, trouble swallowing and voice change  Eyes: Negative  Negative for photophobia and pain     Respiratory: Negative  Negative for shortness of breath  Cardiovascular: Negative  Negative for palpitations  Gastrointestinal: Negative  Negative for nausea and vomiting  Endocrine: Negative  Negative for cold intolerance and heat intolerance  Genitourinary: Positive for frequency  Negative for dysuria and urgency  Genital sores:      Musculoskeletal: Negative for myalgias and neck pain  Skin: Negative  Negative for rash  Neurological: Negative  Negative for dizziness, tremors, seizures, syncope, facial asymmetry, speech difficulty, weakness, light-headedness, numbness and headaches  Hematological: Negative  Does not bruise/bleed easily  Psychiatric/Behavioral: Positive for sleep disturbance (ok)  Negative for confusion and hallucinations  Review of systems personally reviewed

## 2019-08-29 NOTE — PATIENT INSTRUCTIONS
Parkinson's disease ([facility] [address] )  Exam reveals overall stability of symptoms as well as the report of the pt and his wife  He will continue carbidopa levadopa as prescribed  He will continue Azilect as well  He was encouraged to get regular exercise  Twenty five minutes were spent with the patient gathering history, examining patient and formulating a treatment plan  Pt/family understood and were in agreement with the treatment plan  He will follow-up as scheduled with Dr Caprice Adhikari

## 2019-10-10 ENCOUNTER — IMMUNIZATIONS (OUTPATIENT)
Dept: FAMILY MEDICINE CLINIC | Facility: CLINIC | Age: 79
End: 2019-10-10
Payer: COMMERCIAL

## 2019-10-10 ENCOUNTER — TELEPHONE (OUTPATIENT)
Dept: FAMILY MEDICINE CLINIC | Facility: CLINIC | Age: 79
End: 2019-10-10

## 2019-10-10 DIAGNOSIS — Z23 NEED FOR INFLUENZA VACCINATION: Primary | ICD-10-CM

## 2019-10-10 PROCEDURE — 90662 IIV NO PRSV INCREASED AG IM: CPT

## 2019-10-10 PROCEDURE — G0008 ADMIN INFLUENZA VIRUS VAC: HCPCS

## 2019-10-10 NOTE — TELEPHONE ENCOUNTER
Phone call to [facility] and spoke with Princess Narvaez and updated her that no referrals are necessary for this pt      [facility] #    [id number]  [id number]

## 2019-10-11 ENCOUNTER — OFFICE VISIT (OUTPATIENT)
Dept: UROLOGY | Facility: MEDICAL CENTER | Age: 79
End: 2019-10-11
Payer: COMMERCIAL

## 2019-10-11 ENCOUNTER — TELEPHONE (OUTPATIENT)
Dept: UROLOGY | Facility: AMBULATORY SURGERY CENTER | Age: 79
End: 2019-10-11

## 2019-10-11 VITALS
BODY MASS INDEX: 30.16 KG/M2 | SYSTOLIC BLOOD PRESSURE: 122 MMHG | DIASTOLIC BLOOD PRESSURE: 78 MMHG | WEIGHT: 199 LBS | HEIGHT: 68 IN

## 2019-10-11 DIAGNOSIS — C79.51 BONE METASTASES (HCC): Primary | ICD-10-CM

## 2019-10-11 DIAGNOSIS — R35.1 NOCTURIA: ICD-10-CM

## 2019-10-11 DIAGNOSIS — C61 PROSTATE CANCER (HCC): ICD-10-CM

## 2019-10-11 PROCEDURE — 96402 CHEMO HORMON ANTINEOPL SQ/IM: CPT

## 2019-10-11 PROCEDURE — 99214 OFFICE O/P EST MOD 30 MIN: CPT

## 2019-10-11 RX ORDER — NITROGLYCERIN 0.4 MG/1
0.4 TABLET SUBLINGUAL AS NEEDED
COMMUNITY
Start: 2019-10-10

## 2019-10-11 NOTE — TELEPHONE ENCOUNTER
Was informed by Clerical(LOKESH) that [facility] does not require referrals now  Daughter notified

## 2019-10-11 NOTE — PROGRESS NOTES
Assessment/Plan:    Prostate cancer [facility])  Patient with stage D2 prostate cancer  PSA remains undetectable  He is doing well on current regimen of Lupron and Zytiga  He is followed by [facility]  He will return in 6 months for repeat injection  Nocturia  The patient notes nocturia x4  He is not happy with his voiding pattern because of this  Flomax did not improve the situation  Day times are good  Options were discussed  At this point we will continue to follow his voiding pattern with watchful waiting  He is scheduled for cardiac catheterization secondary to unstable angina  He will return in 6 months for follow-up  Diagnoses and all orders for this visit:    Bone metastases (HCC)  -     leuprolide (LUPRON DEPOT 6 MONTH KIT) IM injection kit 45 mg  -     PSA Total, Diagnostic; Future  -     Testosterone; Future    Prostate cancer (HCC)  -     leuprolide (LUPRON DEPOT 6 MONTH KIT) IM injection kit 45 mg  -     PSA Total, Diagnostic; Future  -     Testosterone; Future    Nocturia    Other orders  -     nitroglycerin (NITROSTAT) 0 4 mg SL tablet; Place 0 4 mg under the tongue          Subjective:      Patient ID: Donal Braxton is a 78 y o  male  HPI:  Prostate cancer    20-year-old male with stage D2 prostate cancer who returns to the office for follow-up  The patient remains on depot injections of Lupron  He is also treated with Zytiga under the auspices of [facility]  He notes he voids adequately during the daytime  His stream is adequate and he feels he empties his bladder well  He has no frequency, urgency or incontinence  At night he gets up approximately 4 times which is bothersome  He denies any new back or bone pain  There is no gross hematuria, dysuria or symptoms of infection  The patient has no other constitutional symptoms and there has been no weight loss  He does report recent chest pain for which he will be having a cardiac catheterization next week    His Parkinson's disease remains stable  The following portions of the patient's history were reviewed and updated as appropriate: allergies, current medications, past family history, past medical history, past social history, past surgical history and problem list     Review of Systems   Constitutional: Negative for chills, diaphoresis, fatigue and fever  HENT: Negative  Eyes: Negative  Respiratory: Negative  Cardiovascular: Positive for chest pain  Cardiac cath next week   Gastrointestinal: Negative  Endocrine: Negative  Genitourinary:        See HPI   Musculoskeletal: Negative  Skin: Negative  Allergic/Immunologic: Negative  Neurological: Positive for tremors  Hematological: Negative  Psychiatric/Behavioral: Negative  Objective:      /78 (BP Location: Left arm, Patient Position: Sitting, Cuff Size: Adult)   Ht 5' 8" (1 727 m)   Wt 90 3 kg (199 lb)   BMI 30 26 kg/m²          Physical Exam   Constitutional: He is oriented to person, place, and time  He appears well-developed and well-nourished  HENT:   Head: Normocephalic and atraumatic  Eyes: Conjunctivae are normal    Neck: Neck supple  Cardiovascular: Normal rate  Pulmonary/Chest: Effort normal    Abdominal: Soft  Bowel sounds are normal  He exhibits no distension and no mass  There is no tenderness  There is no rebound, no guarding and no CVA tenderness  No hernia  Genitourinary: Rectum normal, testes normal and penis normal  Right testis shows no mass  Left testis shows no mass  No phimosis or hypospadias  Musculoskeletal: He exhibits no edema or tenderness  No CVAT   Neurological: He is alert and oriented to person, place, and time  Tremor at rest   Skin: Skin is warm and dry  Psychiatric: He has a normal mood and affect  His behavior is normal  Judgment and thought content normal    Vitals reviewed

## 2019-10-11 NOTE — LETTER
October 11, 2019     Nruia Reveal, [address] [facility]  [address] [place] [address]    Patient: Khushboo Briceno   YOB: 1940   Date of Visit: 10/11/2019       Dear Dr Lance Paul: Thank you for referring Khushboo Briceno to me for evaluation  Below are my notes for this consultation  If you have questions, please do not hesitate to call me  I look forward to following your patient along with you  Sincerely,        Santana Dash MD        CC: No Recipients  Santana Dash MD  10/11/2019 12:44 PM  Sign at close encounter  Assessment/Plan:    Prostate cancer [facility])  Patient with stage D2 prostate cancer  PSA remains undetectable  He is doing well on current regimen of Lupron and Zytiga  He is followed by [facility]  He will return in 6 months for repeat injection  Nocturia  The patient notes nocturia x4  He is not happy with his voiding pattern because of this  Flomax did not improve the situation  Day times are good  Options were discussed  At this point we will continue to follow his voiding pattern with watchful waiting  He is scheduled for cardiac catheterization secondary to unstable angina  He will return in 6 months for follow-up  Diagnoses and all orders for this visit:    Bone metastases (HCC)  -     leuprolide (LUPRON DEPOT 6 MONTH KIT) IM injection kit 45 mg  -     PSA Total, Diagnostic; Future  -     Testosterone; Future    Prostate cancer (HCC)  -     leuprolide (LUPRON DEPOT 6 MONTH KIT) IM injection kit 45 mg  -     PSA Total, Diagnostic; Future  -     Testosterone; Future    Nocturia    Other orders  -     nitroglycerin (NITROSTAT) 0 4 mg SL tablet; Place 0 4 mg under the tongue          Subjective:      Patient ID: Khushboo Briceno is a 78 y o  male  HPI:  Prostate cancer    49-year-old male with stage D2 prostate cancer who returns to the office for follow-up  The patient remains on depot injections of Lupron    He is also treated with Zytiga under the auspices of [facility]  He notes he voids adequately during the daytime  His stream is adequate and he feels he empties his bladder well  He has no frequency, urgency or incontinence  At night he gets up approximately 4 times which is bothersome  He denies any new back or bone pain  There is no gross hematuria, dysuria or symptoms of infection  The patient has no other constitutional symptoms and there has been no weight loss  He does report recent chest pain for which he will be having a cardiac catheterization next week  His Parkinson's disease remains stable  The following portions of the patient's history were reviewed and updated as appropriate: allergies, current medications, past family history, past medical history, past social history, past surgical history and problem list     Review of Systems   Constitutional: Negative for chills, diaphoresis, fatigue and fever  HENT: Negative  Eyes: Negative  Respiratory: Negative  Cardiovascular: Positive for chest pain  Cardiac cath next week   Gastrointestinal: Negative  Endocrine: Negative  Genitourinary:        See HPI   Musculoskeletal: Negative  Skin: Negative  Allergic/Immunologic: Negative  Neurological: Positive for tremors  Hematological: Negative  Psychiatric/Behavioral: Negative  Objective:      /78 (BP Location: Left arm, Patient Position: Sitting, Cuff Size: Adult)   Ht 5' 8" (1 727 m)   Wt 90 3 kg (199 lb)   BMI 30 26 kg/m²           Physical Exam   Constitutional: He is oriented to person, place, and time  He appears well-developed and well-nourished  HENT:   Head: Normocephalic and atraumatic  Eyes: Conjunctivae are normal    Neck: Neck supple  Cardiovascular: Normal rate  Pulmonary/Chest: Effort normal    Abdominal: Soft  Bowel sounds are normal  He exhibits no distension and no mass  There is no tenderness  There is no rebound, no guarding and no CVA tenderness  No hernia  Genitourinary: Rectum normal, testes normal and penis normal  Right testis shows no mass  Left testis shows no mass  No phimosis or hypospadias  Musculoskeletal: He exhibits no edema or tenderness  No CVAT   Neurological: He is alert and oriented to person, place, and time  Tremor at rest   Skin: Skin is warm and dry  Psychiatric: He has a normal mood and affect  His behavior is normal  Judgment and thought content normal    Vitals reviewed

## 2019-10-11 NOTE — PATIENT INSTRUCTIONS
Leuprolide (By injection)   Leuprolide ([id number])  Treats endometriosis, uterine fibroids, and premature puberty  Also treats symptoms of prostate cancer  Brand Name(s): Eligard, Lupaneta Pack, Lupron Depot, Lupron Depot-Ped   There may be other brand names for this medicine  When This Medicine Should Not Be Used: This medicine is not right for everyone  You should not receive it if you had an allergic reaction to leuprolide or similar medicines, or if you are pregnant or breastfeeding  How to Use This Medicine:   Injectable  · Your doctor will prescribe your exact dose and schedule  This medicine is given as a shot into a muscle or under the skin  Leuprolide injection is given on different schedules for different conditions  It might be given every day, once a month, or every few months  · A nurse or other health provider will give you this medicine  · You may be taught how to give your medicine at home  Make sure you understand all instructions before giving yourself an injection  Do not use more medicine or use it more often than your doctor tells you to  · You will be shown the body areas where this shot can be given  Use a different body area each time you give yourself a shot  Keep track of where you give each shot to make sure you rotate body areas  · Use a new needle and syringe each time you inject your medicine  · Read and follow the patient instructions that come with this medicine  Talk to your doctor or pharmacist if you have any questions  · Missed dose: This medicine needs to be given on a fixed schedule  If you miss a dose, call your doctor, home health caregiver, or treatment clinic for instructions  · If you store this medicine at home, keep it at room temperature, away from heat, moisture, and direct light  Drugs and Foods to Avoid:   Ask your doctor or pharmacist before using any other medicine, including over-the-counter medicines, vitamins, and herbal products    · Some foods and medicines can affect how leuprolide works  Tell your doctor if you are using the following:  ¨ Medicine to treat heart rhythm problems  ¨ Medicine to treat depression (such as bupropion)  ¨ Medicine to treat seizures  ¨ Steroid medicine (such as hydrocortisone, methylprednisolone, prednisone, prednisolone, or dexamethasone)  Warnings While Using This Medicine:   · It is not safe to take this medicine during pregnancy  It could harm an unborn baby  Tell your doctor right away if you become pregnant  Women who are using this medicine should use birth control that does not contain hormones  · Tell your doctor if you have kidney disease, heart failure, diabetes, heart or blood vessel disease, heart rhythm problems (such as long QT syndrome), problems with your nervous system, or a history of depression, seizures, or stroke  · Women: Your menstrual periods should stop, but you might have light bleeding or spotting  If you continue to have heavy bleeding or regular periods, call your doctor  · This medicine may cause the following problems:  ¨ Heart rhythm changes  ¨ Weaker bones, which may lead to osteoporosis  ¨ Problems with the urinary tract or spinal cord (in men)  ¨ Changes in blood sugar levels (in men)  ¨ Higher risk of heart attack or stroke (in men)  · Your symptoms might get worse when you first start using this medicine, but then they should get better as the medicine starts to work  If your condition does not begin to improve after 2 weeks, call your doctor  · Tell any doctor or dentist who treats you that you are using this medicine  This medicine may affect certain medical test results  · Your doctor will check your progress and the effects of this medicine at regular visits  Keep all appointments  · Keep all medicine out of the reach of children  Never share your medicine with anyone    Possible Side Effects While Using This Medicine:   Call your doctor right away if you notice any of these side effects:  · Allergic reaction: Itching or hives, swelling in your face or hands, swelling or tingling in your mouth or throat, chest tightness, trouble breathing  · Change in how much or how often you urinate  · Depression or severe moodiness or emotional problems  · Fast, pounding, or uneven heart beat  · Heavy vaginal bleeding  · Seizures  · Unusual or severe bone or back pain  If you notice these less serious side effects, talk with your doctor:   · Headache  · Hot flashes and sweating, warmth or redness in your face, neck, arms, or upper chest  · Loss of interest in sex, sexual problems  · Moodiness  · Pain, itching, burning, bruises, or swelling where the shot was given  If you notice other side effects that you think are caused by this medicine, tell your doctor  Call your doctor for medical advice about side effects  You may report side effects to FDA at 7-516-FDA-3382  © 2017 [address] [employer] Information is for End User's use only and may not be sold, redistributed or otherwise used for commercial purposes  The above information is an  only  It is not intended as medical advice for individual conditions or treatments  Talk to your doctor, nurse or pharmacist before following any medical regimen to see if it is safe and effective for you

## 2019-10-11 NOTE — ASSESSMENT & PLAN NOTE
Patient with stage D2 prostate cancer  PSA remains undetectable  He is doing well on current regimen of Lupron and Zytiga  He is followed by  [facility]  He will return in 6 months for repeat injection

## 2019-10-11 NOTE — TELEPHONE ENCOUNTER
Patient managed by [employer] is calling to ask if we got referral for Lupron   Patient wife would like call back he is scheduled today at 11:45 am

## 2019-10-11 NOTE — ASSESSMENT & PLAN NOTE
The patient notes nocturia x4  He is not happy with his voiding pattern because of this  Flomax did not improve the situation  Day times are good  Options were discussed  At this point we will continue to follow his voiding pattern with watchful waiting  He is scheduled for cardiac catheterization secondary to unstable angina  He will return in 6 months for follow-up

## 2019-10-16 ENCOUNTER — TELEPHONE (OUTPATIENT)
Dept: NEUROLOGY | Facility: CLINIC | Age: 79
End: 2019-10-16

## 2019-10-16 NOTE — TELEPHONE ENCOUNTER
It is recommended that he take his medications as close to his normal schedule as possible  If the surgeon allows he should take the morning doses with sips  He should resume the medications as soon as possible after surgery  Thank you

## 2019-10-16 NOTE — TELEPHONE ENCOUNTER
Pt's wife Alice Lala called and states that pt is scheduled for triple bypass the end of Oct  Pls see [facility] notes     Asking if pt needs to hold any of his parkinson's meds    Current meds  rasagiline 1 mg daily  Sinemet  mg Take 2 tabs every 3 hours (6AM, 9AM, 12PM, 3PM, 6PM) and 1 tab 2x upon awakening at night (10PM and 2AM              [phone] ok to leave detailed message

## 2019-10-28 ENCOUNTER — TELEPHONE (OUTPATIENT)
Dept: FAMILY MEDICINE CLINIC | Facility: CLINIC | Age: 79
End: 2019-10-28

## 2019-10-28 NOTE — TELEPHONE ENCOUNTER
Patient states he has up coming surgery on nov 5th, he is not sure if he needs to be cleared  I had a hard time understanding him so I made a same day apt to discuss

## 2019-11-14 ENCOUNTER — TELEPHONE (OUTPATIENT)
Dept: NEUROLOGY | Facility: CLINIC | Age: 79
End: 2019-11-14

## 2019-11-14 NOTE — TELEPHONE ENCOUNTER
I see why this is confusing  The pt would take extra Sinemet when he wakes up at night  His home schedule is:  Sinemet 25/100 1 5 tabs at 6AM, 9AM, 12PM, and 6PM  Sinemet 25/100 2 tabs at 3PM  Sinemet 25/100 1 tab at 9PM, 12AM and 3AM     Thank you

## 2019-11-14 NOTE — TELEPHONE ENCOUNTER
Call received from [facility] - NP at patients facility  She is asking for clarification on patients Sinemet dosing  Last office note seems confusing:    "He remains on Sinemet 1 5tabs [id number] (6am, 9am, 12pm, 6pm and 2 tabs at 3PM) and Azilect 1mg daily  At his last appt he reported waking up about 4x per night to urinate and would take a tablet of carbidopa levadopa each time he wakes up  He also took extra medication when he is traveling  He ran out of medication prior to the next refill because he was taking the extra medication  Sinemet CR 50/200mg 1 tab at bedtime was trialed but he did not like how he felt when he took it  He reports that when he took it he felt tense and anxious  He is now taking Sinemet 25/100 1 tab at 9PM, 12 AM and 3PM "      What should patient be taking?     She is requesting a callback at [phone] or [phone]

## 2019-11-20 ENCOUNTER — TELEPHONE (OUTPATIENT)
Dept: FAMILY MEDICINE CLINIC | Facility: CLINIC | Age: 79
End: 2019-11-20

## 2019-11-30 DIAGNOSIS — G20 PARKINSON'S DISEASE (HCC): ICD-10-CM

## 2019-12-30 ENCOUNTER — OFFICE VISIT (OUTPATIENT)
Dept: NEUROLOGY | Facility: CLINIC | Age: 79
End: 2019-12-30
Payer: COMMERCIAL

## 2019-12-30 VITALS
SYSTOLIC BLOOD PRESSURE: 132 MMHG | WEIGHT: 194.2 LBS | DIASTOLIC BLOOD PRESSURE: 60 MMHG | HEIGHT: 68 IN | BODY MASS INDEX: 29.43 KG/M2 | HEART RATE: 57 BPM

## 2019-12-30 DIAGNOSIS — G20 PARKINSON'S DISEASE (HCC): Primary | ICD-10-CM

## 2019-12-30 DIAGNOSIS — I25.9 CHRONIC ISCHEMIC HEART DISEASE, UNSPECIFIED: ICD-10-CM

## 2019-12-30 PROCEDURE — 99214 OFFICE O/P EST MOD 30 MIN: CPT | Performed by: PSYCHIATRY & NEUROLOGY

## 2019-12-30 RX ORDER — RASAGILINE 1 MG/1
1 TABLET ORAL DAILY
Qty: 90 TABLET | Refills: 3 | Status: SHIPPED | OUTPATIENT
Start: 2019-12-30 | End: 2020-09-08 | Stop reason: SDUPTHER

## 2019-12-30 NOTE — ASSESSMENT & PLAN NOTE
Progression of Parkinsonian symptoms after recent surgery  He is recovering and functioning quite well  Currently still in cardiac rehab  There is a mild increase in bradykinesia on exam, more so on the left  Time spent discussing progression  We will try increasing Sinemet to 25/100 2 tabs at 9am , 12pm and 3pm and continue 1 5 tabs at 6pm and 9pm with the prn 1 tab dose overnight

## 2019-12-30 NOTE — PATIENT INSTRUCTIONS
Mild increase in bradykinesia on exam  We will try increasing Sinemet to 25/100 2 tabs at 9am , 12pm and 3pm and continue 1 5 tabs at 6pm and 9pm with the prn 1 tab dose overnight

## 2019-12-30 NOTE — PROGRESS NOTES
Patient ID: Rose Pyle is a 78 y o  male  Assessment/Plan:    [facility]  Progression of Parkinsonian symptoms after recent surgery  He is recovering and functioning quite well  Currently still in cardiac rehab  There is a mild increase in bradykinesia on exam, more so on the left  Time spent discussing progression  We will try increasing Sinemet to 25/100 2 tabs at 9am , 12pm and 3pm and continue 1 5 tabs at 6pm and 9pm with the prn 1 tab dose overnight  Diagnoses and all orders for this visit:    Parkinson's disease ([facility] )  -     rasagiline (AZILECT) 1 MG; Take 1 tablet (1 mg total) by mouth daily  -     carbidopa-levodopa (SINEMET)  mg per tablet; Take 2 at 6AM, 9AM, 12PM, 3PM and 1 5 tabs at 6PM and 9pm, and 1 tab 2x upon awakening at night prn    Chronic ischemic heart disease, unspecified    Other orders  -     Cholecalciferol (VITAMIN D3 PO); Take by mouth           Subjective:     Rose Pyle is a retired  with prostate cancer on hormonal treatment, coronary artery disease s/p CABG x3 on  11/5/19, and Parkinson's disease who presents for follow up  To review, symptoms began with right hand rest tremor and decrease in fine motor movements which progressed to include right lower extremity tremor and slowness in movements  He was diagnosed August 2010 and started on Sinemet 25/100 tid and Azilect  Previous trial of ropinirole ER was associated with side effects  He underwent cardaic bypass in November  He is recovering well and is still in rehab  He did slow down surrounding events and has not returned to baseline  He is on Sinemet 25/100 1 5 tabs q 3 hours at 9am 12pm, 6pm and 9pm and 3 tabs ay 3pm and 1 tab prn night  On occasion he may take 2 tablets  He finds that he slows down later in the afternoon  At this time tremors can return  Stressful situations can cause him to shake   He has not been freezing as much as he was a year ago  Verla Mix are no issues with lightheadedness or fatigue  He has no constipation  He has urinary frequency and uses a urinal at [employer]  He denies any hallucinations or changes in cognition  Speech is soft  There is no drooling or difficulty swallowing  He is slow with dressing or hygiene acts  Gait is slow but  unchanged without freezing or festination  He continues to have interrupted sleep from 8pm to 6am  He is up multiple times to use the urinal and has trouble falling back to sleep  Objective:    Blood pressure 132/60, pulse 57, height 5' 8" (1 727 m), weight 88 1 kg (194 lb 3 2 oz)  Physical Exam   Constitutional: He appears well-developed  Eyes: Pupils are equal, round, and reactive to light  Neurological: He is alert  He has normal strength  Vitals reviewed  Neurological Exam  Mental Status  Alert  Oriented only to person, place and situation  Speech: hypophonia  Language is fluent with no aphasia  Attention and concentration are normal     Cranial Nerves  CN III, IV, VI: Extraocular movements intact bilaterally  Pupils equal round and reactive to light bilaterally  CN V: Facial sensation is normal   CN VII: Full and symmetric facial movement  CN VIII: Hearing is normal   CN IX, X: Palate elevates symmetrically  CN XI: Shoulder shrug strength is normal   CN XII: Tongue midline without atrophy or fasciculations  Motor   Normal muscle tone  Strength is 5/5 throughout all four extremities  Sensory  Light touch is normal in upper and lower extremities  Coordination  Right: Finger-to-nose normal  Rapid alternating movement abnormality:  Left: Finger-to-nose normal  Rapid alternating movement abnormality:  See MDS UPDRS III  Gait  Casual gait: Abnormal pull test  Recovered in 4 steps    Able to rise from chair without using arms  Moderate stooped  Good speed with slightly shortened stride        MDS UPDRS III                                 12/30/19   Time since last dose: Due now, took in office so partially off  took in [address]  2  2   Facial Expression  3  3   Rigidity - Neck  0  0   Rigidity - Upper Extremity (Right)  1  0   Rigidity - Upper Extremity (Left)   1  0   Rigidity - Lower Extremity (Right)  2  1   Rigidity - Lower Extremity (Left)   0  0   Finger Taps (Right)   2  2   Finger Taps (Left)   2  2   Hand Movement (Right)  2  1   Hand Movement (Left)   1  1   Pronation/Supination (Right)  1  1   Pronation/Supination (Left)   1  2   Toe Tapping (Right) 1  1   Toe Tapping (Left) 1  2   Leg Agility (Right)  0  0   Leg Agility (Left)   0  0   Arising from Chair   0  0   Gait   2  1   Freezing of Gait 0  0   Postural Stability   0  1   Posture 3  3   Global spontaneity of movement 3  2   Postural Tremor (Right) 0 0   Postural Tremor (Left) 0 0   Kinetic Tremor (Right)  0 0   Kinetic Tremor (Left)  0  0   Rest tremor amplitude RUE 2  1   Rest tremor amplitude LUE 2  1   Rest tremor amplitude RLE 0  0   Reset tremor amplitude LLE 0  0   Lip/Jaw Tremor  1  0           Motor Exam Total:                  ROS:    Review of Systems   Constitutional: Positive for appetite change (more hungry)  Negative for fever  HENT: Positive for voice change  Negative for hearing loss, tinnitus and trouble swallowing  Dry mouth     Eyes: Negative  Negative for photophobia and pain  Respiratory: Negative  Negative for shortness of breath  Cardiovascular: Negative  Negative for palpitations  Gastrointestinal: Negative  Negative for nausea and vomiting  Endocrine: Negative  Negative for cold intolerance and heat intolerance  Genitourinary: Negative  Negative for dysuria, frequency and urgency  Musculoskeletal: Positive for gait problem  Negative for myalgias and neck pain  Balance issues     Skin: Negative  Negative for rash  Allergic/Immunologic: Negative      Neurological: Negative for dizziness, tremors, seizures, syncope, facial asymmetry, speech difficulty, weakness, light-headedness, numbness and headaches  Hematological: Bruises/bleeds easily  Psychiatric/Behavioral: Negative  Negative for confusion, hallucinations and sleep disturbance  All other systems reviewed and are negative  Review of system was personally reviewed

## 2020-01-07 ENCOUNTER — TELEPHONE (OUTPATIENT)
Dept: FAMILY MEDICINE CLINIC | Facility: CLINIC | Age: 80
End: 2020-01-07

## 2020-01-07 DIAGNOSIS — G20 PARKINSON'S DISEASE (HCC): Primary | ICD-10-CM

## 2020-01-07 DIAGNOSIS — R26.2 AMBULATORY DYSFUNCTION: ICD-10-CM

## 2020-01-07 NOTE — TELEPHONE ENCOUNTER
Patient wife phoned in and explained that she needs an order for a transports chair, she never gave [employer] the order from July because she says that they were using her mothers chair at the time but had to give it back, wondering if another order can be printed and faxed  please advise

## 2020-01-21 DIAGNOSIS — R26.2 AMBULATORY DYSFUNCTION: ICD-10-CM

## 2020-01-21 DIAGNOSIS — G20 PARKINSON'S DISEASE (HCC): Primary | ICD-10-CM

## 2020-01-21 NOTE — TELEPHONE ENCOUNTER
Pts wife called regarding the transport chair for pt that was ordered  She states that [place] never received it  I found the order in the chart, and it looks like it went electronically to [facility] pharmacy   This would need to be printed so that I can fax it to [facility]

## 2020-03-09 ENCOUNTER — TELEPHONE (OUTPATIENT)
Dept: NEUROLOGY | Facility: CLINIC | Age: 80
End: 2020-03-09

## 2020-03-09 NOTE — TELEPHONE ENCOUNTER
Zach Bonilla, pt's wife (on communication consent), states the patient will be seeing OT on Thursday  Asking for a script for a scooter for the patient, states OT will help determine the type needed  This is to assist patient with ambulation in relation to his PD per the wife  Please enter script if agreeable   Script should be faxed to below:    Abdulaziz Lima [address] OT  F: [place] 72 [phone]  Okay to leave detailed message

## 2020-03-10 DIAGNOSIS — G20 PARKINSON'S DISEASE (HCC): Primary | ICD-10-CM

## 2020-03-11 DIAGNOSIS — G20 PARKINSON'S DISEASE (HCC): Primary | ICD-10-CM

## 2020-03-11 NOTE — TELEPHONE ENCOUNTER
Received call from Donna at [facility] stating they received the scooter script, but they also need a script for OT wheelchair eval to be faxed  Once ordered, please send to MA to fax

## 2020-04-07 ENCOUNTER — TELEPHONE (OUTPATIENT)
Dept: UROLOGY | Facility: MEDICAL CENTER | Age: 80
End: 2020-04-07

## 2020-04-13 ENCOUNTER — TELEMEDICINE (OUTPATIENT)
Dept: UROLOGY | Facility: MEDICAL CENTER | Age: 80
End: 2020-04-13
Payer: COMMERCIAL

## 2020-04-13 DIAGNOSIS — C61 PROSTATE CANCER (HCC): ICD-10-CM

## 2020-04-13 DIAGNOSIS — R35.1 NOCTURIA: Primary | ICD-10-CM

## 2020-04-13 PROCEDURE — 99214 OFFICE O/P EST MOD 30 MIN: CPT | Performed by: UROLOGY

## 2020-04-13 RX ORDER — ROSUVASTATIN CALCIUM 40 MG/1
TABLET, COATED ORAL
COMMUNITY
Start: 2020-03-07

## 2020-05-01 ENCOUNTER — TELEPHONE (OUTPATIENT)
Dept: NEUROLOGY | Facility: CLINIC | Age: 80
End: 2020-05-01

## 2020-05-04 ENCOUNTER — TELEPHONE (OUTPATIENT)
Dept: NEUROLOGY | Facility: CLINIC | Age: 80
End: 2020-05-04

## 2020-05-04 ENCOUNTER — TELEMEDICINE (OUTPATIENT)
Dept: NEUROLOGY | Facility: CLINIC | Age: 80
End: 2020-05-04
Payer: COMMERCIAL

## 2020-05-04 DIAGNOSIS — G20 PARKINSON'S DISEASE (HCC): Primary | ICD-10-CM

## 2020-05-04 PROCEDURE — 1160F RVW MEDS BY RX/DR IN RCRD: CPT | Performed by: PSYCHIATRY & NEUROLOGY

## 2020-05-04 PROCEDURE — 99213 OFFICE O/P EST LOW 20 MIN: CPT | Performed by: PSYCHIATRY & NEUROLOGY

## 2020-05-04 RX ORDER — ABIRATERONE 500 MG/1
TABLET ORAL 2 TIMES DAILY
COMMUNITY

## 2020-05-21 ENCOUNTER — TELEPHONE (OUTPATIENT)
Dept: FAMILY MEDICINE CLINIC | Facility: CLINIC | Age: 80
End: 2020-05-21

## 2020-06-11 ENCOUNTER — TELEPHONE (OUTPATIENT)
Dept: FAMILY MEDICINE CLINIC | Facility: CLINIC | Age: 80
End: 2020-06-11

## 2020-06-25 ENCOUNTER — TELEPHONE (OUTPATIENT)
Dept: FAMILY MEDICINE CLINIC | Facility: CLINIC | Age: 80
End: 2020-06-25

## 2020-08-10 ENCOUNTER — TELEPHONE (OUTPATIENT)
Dept: NEUROLOGY | Facility: CLINIC | Age: 80
End: 2020-08-10

## 2020-08-10 NOTE — TELEPHONE ENCOUNTER
Patient and wife calling in  She states that several weeks ago, patient started having increased freezing and shuffling to the point that he couldn't walk  They do not recall dose patient was on  She states patient has decreased sinemet now to Sinemet 1 tab at 9am, 1 tab at 1pm, 1 tab at 4pm  Rasagiline 1mg daily  He has been on this for a few days but has started to develop slight tremor in B/L hands and lips  He states his walking has improved since  He denies falls, involuntary movements, or hallucinations       Please advise  [phone]

## 2020-08-14 NOTE — TELEPHONE ENCOUNTER
I called and reviewed with Shira Basilio and patient  She states he adjusted dosing since speaking to our office  He is taking 1/2 tab in AM, 1 tab at noon, 1 tab at 3pm, 1/2 tab at 6pm    His walking has improved on this dosing  His tremors are slightly better than on Monday  Would you like patient to try 1 5 tab TID?

## 2020-08-14 NOTE — TELEPHONE ENCOUNTER
This is a significant reduction in his Sinemet  If he feels he walks better on lower doses but tremor returned, was there a dose in between want he was taking before (2 tab qid) and this dose that worked fairly better for both   For example perhaps trying 1 5 tabs 3 times daily

## 2020-09-08 ENCOUNTER — OFFICE VISIT (OUTPATIENT)
Dept: NEUROLOGY | Facility: CLINIC | Age: 80
End: 2020-09-08
Payer: COMMERCIAL

## 2020-09-08 VITALS
TEMPERATURE: 97.4 F | HEART RATE: 64 BPM | SYSTOLIC BLOOD PRESSURE: 138 MMHG | BODY MASS INDEX: 31.1 KG/M2 | WEIGHT: 205.2 LBS | DIASTOLIC BLOOD PRESSURE: 64 MMHG | HEIGHT: 68 IN

## 2020-09-08 DIAGNOSIS — G20 PARKINSON'S DISEASE (HCC): Primary | ICD-10-CM

## 2020-09-08 PROCEDURE — 99215 OFFICE O/P EST HI 40 MIN: CPT | Performed by: PHYSICIAN ASSISTANT

## 2020-09-08 PROCEDURE — 3078F DIAST BP <80 MM HG: CPT | Performed by: PHYSICIAN ASSISTANT

## 2020-09-08 PROCEDURE — 1036F TOBACCO NON-USER: CPT | Performed by: PHYSICIAN ASSISTANT

## 2020-09-08 PROCEDURE — 3075F SYST BP GE 130 - 139MM HG: CPT | Performed by: PHYSICIAN ASSISTANT

## 2020-09-08 PROCEDURE — 1160F RVW MEDS BY RX/DR IN RCRD: CPT | Performed by: PHYSICIAN ASSISTANT

## 2020-09-08 RX ORDER — RASAGILINE 1 MG/1
1 TABLET ORAL DAILY
Qty: 90 TABLET | Refills: 3 | Status: SHIPPED | OUTPATIENT
Start: 2020-09-08 | End: 2021-01-01

## 2020-09-08 RX ORDER — ZONISAMIDE 25 MG/1
CAPSULE ORAL
Qty: 60 CAPSULE | Refills: 3 | Status: SHIPPED | OUTPATIENT
Start: 2020-09-08 | End: 2020-12-22

## 2020-09-08 NOTE — PATIENT INSTRUCTIONS
Patient with overall improvement of his freezing since reducing his Sinemet dose  He does however have worsening tremors, bradykinesia, and rigidity on exam today  Discussed the option of adding another medication on board to try and help with some of these other parkinsonian symptoms  He is adamant that he does not wish to increase his Sinemet given he has had significant improvement of his freezing since reducing the dose  Will try and add a low dose of zonisamide to see if perhaps this helps with some of his tremor and slowness without making the freezing worse  He will start with 25 mg before bed  If after 2 weeks he has no benefit but no side effects he can increase further to 2 tabs before bed  For now he will remain on his current dose of Sinemet as he does feel like this is a good balance between his slowness and his freezing  He was encouraged to stay active with his treadmill and recumbent bike daily

## 2020-09-08 NOTE — PROGRESS NOTES
Patient ID: Cristal Wong is a [de-identified] y o  male  Assessment/Plan:    Parkinson's disease ([facility] [address] )  Patient with overall improvement of his freezing since reducing his Sinemet dose  He does however have worsening tremors, bradykinesia, and rigidity on exam today  Discussed the option of adding another medication on board to try and help with some of these other parkinsonian symptoms  He is adamant that he does not wish to increase his Sinemet given he has had significant improvement of his freezing since reducing the dose  Will try and add a low dose of zonisamide to see if perhaps this helps with some of his tremor and slowness without making the freezing worse  He will start with 25 mg before bed  If after 2 weeks he has no benefit but no side effects he can increase further to 2 tabs before bed  For now he will remain on his current dose of Sinemet as he does feel like this is a good balance between his slowness and his freezing  His daughter and wife had some concerns with his driving  Will send him for a Fitness to Drive evaluation at this time  Subjective:    Cristal Wong is an [de-identified] y o  retired  with prostate cancer on hormonal treatment, coronary artery disease s/p CABG x3 on 11/5/19, and Parkinson's disease who presents for follow up  To review, symptoms began with right hand rest tremor and decrease in fine motor movements which progressed to include right lower extremity tremor and slowness in movements  He was diagnosed August 2010 and started on Sinemet 25/100 tid and Azilect  Previous trial of ropinirole ER was associated with side effects  At his last visit he was having increased freezing and slowness at his Sinemet dose was increased (Sinemet 2 tabs at 6am, 9am, and 3pm and 2 5 tabs at 12 noon and 6pm)  He had also been referred to the wheelchair Clinic for possible scooter given difficulties with freezing and endurance      Current medications:  Sinemet 1 tab 6am, 9am, noon, 3pm, 1/2 tab at 6pm  Azilect 1mg daily      Interval history:  Patient never went to wheelchair Clinic given insurance will only cover a power wheelchair for in-home use which was not needed  He does have a wheelchair however it is very heavy and hard to transport around  He had a transport chair however insurance would not cover  His wife called noting that the patient was having increased freezing and difficulty walking and he reduced his Sinemet dose with improvement  He did have some worsening of tremors on lower dose however he preferred to remain on the low dose given his walking was better  His tremors improve when he takes his Sinemet however it will wear off about 30-60 minutes prior to his next dose and the tremors will be worse  He will only notice tremors on the right  His freezing continues to be significantly better since reducing the Sinemet dose  He feels that he can also function better on the lower dose, including doing things like dressing and showering  He will need help with his socks and shoes  He can eat without issues  No difficulty swallowing  He is sleeping well for the most part  His daughter has some concerns for his driving  He denies any accidents or issues  I personally reviewed and updated the ROS  Total time spent today was 45 minutes  Greater than 50% of total time was spent with the patient and / or family counseling and / or coordinating plan of care  Objective:    Blood pressure 138/64, pulse 64, temperature (!) 97 4 °F (36 3 °C), height 5' 8" (1 727 m), weight 93 1 kg (205 lb 3 2 oz)  Physical Exam  Constitutional:       Appearance: Normal appearance  HENT:      Right Ear: No hearing normal       Left Ear: Hearing normal    Eyes:      Extraocular Movements: Extraocular movements intact  Pupils: Pupils are equal, round, and reactive to light     Pulmonary:      Effort: Pulmonary effort is normal    Neurological: Mental Status: He is alert  Deep Tendon Reflexes: Strength normal    Psychiatric:         Speech: Speech normal          Neurological Exam  Mental Status  Alert  Oriented only to person, place and situation  Speech is normal  Speech: hypophonia  Language is fluent with no aphasia  Cranial Nerves  CN III, IV, VI: Extraocular movements intact bilaterally  Pupils equal round and reactive to light bilaterally  CN V:  Right: Facial sensation is normal   Left: Facial sensation is normal on the left  CN VIII:  Right: Hearing is decreased  Left: Hearing is normal   CN XI: Shoulder shrug strength is normal   Patient wearing face mask   Motor   Normal muscle tone  Strength is 5/5 throughout all four extremities  Sensory  Light touch is normal in upper and lower extremities  Coordination  Right: Finger-to-nose normal  Rapid alternating movement abnormality:  Left: Finger-to-nose normal  Rapid alternating movement abnormality:  See MDS UPDRS III  Gait  Casual gait: Abnormal pull test  Recovered in 4 steps    Able to rise from chair without using arms  Moderate stooped posture  Good speed with slightly shortened stride  Using walking stick          MDS UPDRS III                               9/8/20 12/30/19   Time since last dose: Due for meds in 30 min  took in office2   Speech  2  2   Facial Expression    3   Rigidity - Neck  0  0   Rigidity - Upper Extremity (Right)  2  0   Rigidity - Upper Extremity (Left)   1  0   Rigidity - Lower Extremity (Right)  2  1   Rigidity - Lower Extremity (Left)   0  0   Finger Taps (Right)   2  2   Finger Taps (Left)   1  2   Hand Movement (Right)  2  1   Hand Movement (Left)   1  1   Pronation/Supination (Right)  2  1   Pronation/Supination (Left)   2  2   Toe Tapping (Right) 3  1   Toe Tapping (Left) 2  2   Leg Agility (Right)  2  0   Leg Agility (Left)   2  0   Arising from Chair   2  0   Gait   2  1   Freezing of Gait 0  0   Postural Stability   1  1   Posture 3  3 Global spontaneity of movement 3  2   Postural Tremor (Right) 0 0   Postural Tremor (Left) 0 0   Kinetic Tremor (Right)  0 0   Kinetic Tremor (Left)  0  0   Rest tremor amplitude RUE 3  1   Rest tremor amplitude LUE 1  1   Rest tremor amplitude RLE 0  0   Reset tremor amplitude LLE 0  0   Lip/Jaw Tremor  2  0           Motor Exam Total:            ROS:    Review of Systems   Constitutional: Positive for appetite change (Always Hungry)  Negative for fever  HENT: Negative  Negative for hearing loss, tinnitus, trouble swallowing and voice change  Eyes: Negative  Negative for photophobia and pain  Respiratory: Negative  Negative for shortness of breath  Cardiovascular: Negative  Negative for palpitations  Gastrointestinal: Negative  Negative for nausea and vomiting  Endocrine: Negative  Negative for cold intolerance  Genitourinary: Negative  Negative for dysuria, frequency and urgency  Musculoskeletal: Positive for gait problem (Freezing has gotten worse)  Negative for myalgias and neck pain  Some Balance issues     Skin: Negative  Negative for rash  Allergic/Immunologic: Negative  Neurological: Positive for tremors (Hands)  Negative for dizziness, seizures, syncope, facial asymmetry, speech difficulty, weakness, light-headedness, numbness and headaches  Hematological: Bruises/bleeds easily  Psychiatric/Behavioral: Negative  Negative for confusion, hallucinations and sleep disturbance  All other systems reviewed and are negative

## 2020-09-08 NOTE — ASSESSMENT & PLAN NOTE
Patient with overall improvement of his freezing since reducing his Sinemet dose  He does however have worsening tremors, bradykinesia, and rigidity on exam today  Discussed the option of adding another medication on board to try and help with some of these other parkinsonian symptoms  He is adamant that he does not wish to increase his Sinemet given he has had significant improvement of his freezing since reducing the dose  Will try and add a low dose of zonisamide to see if perhaps this helps with some of his tremor and slowness without making the freezing worse  He will start with 25 mg before bed  If after 2 weeks he has no benefit but no side effects he can increase further to 2 tabs before bed  For now he will remain on his current dose of Sinemet as he does feel like this is a good balance between his slowness and his freezing  His daughter and wife had some concerns with his driving  Will send him for a Fitness to Drive evaluation at this time

## 2020-10-05 ENCOUNTER — CLINICAL SUPPORT (OUTPATIENT)
Dept: FAMILY MEDICINE CLINIC | Facility: CLINIC | Age: 80
End: 2020-10-05
Payer: COMMERCIAL

## 2020-10-05 VITALS — TEMPERATURE: 96.3 F

## 2020-10-05 DIAGNOSIS — Z23 NEED FOR INFLUENZA VACCINATION: Primary | ICD-10-CM

## 2020-10-05 PROCEDURE — G0008 ADMIN INFLUENZA VIRUS VAC: HCPCS

## 2020-10-05 PROCEDURE — 90662 IIV NO PRSV INCREASED AG IM: CPT

## 2020-11-02 ENCOUNTER — TELEPHONE (OUTPATIENT)
Dept: UROLOGY | Facility: MEDICAL CENTER | Age: 80
End: 2020-11-02

## 2020-11-06 ENCOUNTER — TELEPHONE (OUTPATIENT)
Dept: FAMILY MEDICINE CLINIC | Facility: CLINIC | Age: 80
End: 2020-11-06

## 2020-12-10 ENCOUNTER — TELEPHONE (OUTPATIENT)
Dept: FAMILY MEDICINE CLINIC | Facility: CLINIC | Age: 80
End: 2020-12-10

## 2020-12-10 ENCOUNTER — OFFICE VISIT (OUTPATIENT)
Dept: FAMILY MEDICINE CLINIC | Facility: CLINIC | Age: 80
End: 2020-12-10
Payer: COMMERCIAL

## 2020-12-10 VITALS
DIASTOLIC BLOOD PRESSURE: 70 MMHG | TEMPERATURE: 95.3 F | HEART RATE: 70 BPM | RESPIRATION RATE: 16 BRPM | OXYGEN SATURATION: 97 % | WEIGHT: 216 LBS | SYSTOLIC BLOOD PRESSURE: 112 MMHG | BODY MASS INDEX: 32.74 KG/M2 | HEIGHT: 68 IN

## 2020-12-10 DIAGNOSIS — R79.89 LOW VITAMIN D LEVEL: ICD-10-CM

## 2020-12-10 DIAGNOSIS — I25.10 ARTERIOSCLEROTIC CORONARY ARTERY DISEASE: ICD-10-CM

## 2020-12-10 DIAGNOSIS — I10 BENIGN ESSENTIAL HYPERTENSION: Primary | ICD-10-CM

## 2020-12-10 DIAGNOSIS — C61 PROSTATE CANCER (HCC): ICD-10-CM

## 2020-12-10 DIAGNOSIS — R26.2 AMBULATORY DYSFUNCTION: ICD-10-CM

## 2020-12-10 DIAGNOSIS — I21.19 ACUTE MYOCARDIAL INFARCTION OF INFERIOR WALL (HCC): ICD-10-CM

## 2020-12-10 DIAGNOSIS — E78.5 HYPERLIPIDEMIA, UNSPECIFIED HYPERLIPIDEMIA TYPE: ICD-10-CM

## 2020-12-10 DIAGNOSIS — G20 PARKINSON'S DISEASE (HCC): ICD-10-CM

## 2020-12-10 PROBLEM — Z95.1 S/P CABG X 3: Status: ACTIVE | Noted: 2019-11-05

## 2020-12-10 PROBLEM — I48.0 PAF (PAROXYSMAL ATRIAL FIBRILLATION) (HCC): Status: ACTIVE | Noted: 2019-11-22

## 2020-12-10 PROCEDURE — T1015 CLINIC SERVICE: HCPCS | Performed by: FAMILY MEDICINE

## 2020-12-10 PROCEDURE — 99214 OFFICE O/P EST MOD 30 MIN: CPT | Performed by: FAMILY MEDICINE

## 2020-12-10 PROCEDURE — 1036F TOBACCO NON-USER: CPT | Performed by: FAMILY MEDICINE

## 2020-12-10 PROCEDURE — 3078F DIAST BP <80 MM HG: CPT | Performed by: FAMILY MEDICINE

## 2020-12-10 PROCEDURE — 36415 COLL VENOUS BLD VENIPUNCTURE: CPT | Performed by: FAMILY MEDICINE

## 2020-12-10 PROCEDURE — 3074F SYST BP LT 130 MM HG: CPT | Performed by: FAMILY MEDICINE

## 2020-12-10 PROCEDURE — 1160F RVW MEDS BY RX/DR IN RCRD: CPT | Performed by: FAMILY MEDICINE

## 2020-12-11 LAB
25(OH)D3 SERPL-MCNC: 36 NG/ML (ref 30–100)
CHOLEST SERPL-MCNC: 105 MG/DL
CHOLEST/HDLC SERPL: 2.8 (CALC)
HDLC SERPL-MCNC: 38 MG/DL
LDLC SERPL CALC-MCNC: 46 MG/DL (CALC)
NONHDLC SERPL-MCNC: 67 MG/DL (CALC)
TRIGL SERPL-MCNC: 125 MG/DL
TSH SERPL-ACNC: 1.89 MIU/L (ref 0.4–4.5)

## 2020-12-15 ENCOUNTER — TELEPHONE (OUTPATIENT)
Dept: FAMILY MEDICINE CLINIC | Facility: CLINIC | Age: 80
End: 2020-12-15

## 2020-12-19 DIAGNOSIS — G20 PARKINSON'S DISEASE (HCC): ICD-10-CM

## 2020-12-22 RX ORDER — ZONISAMIDE 25 MG/1
CAPSULE ORAL
Qty: 180 CAPSULE | Refills: 1 | Status: SHIPPED | OUTPATIENT
Start: 2020-12-22 | End: 2021-02-17

## 2021-01-01 ENCOUNTER — TELEPHONE (OUTPATIENT)
Dept: FAMILY MEDICINE CLINIC | Facility: CLINIC | Age: 81
End: 2021-01-01

## 2021-01-01 ENCOUNTER — OFFICE VISIT (OUTPATIENT)
Dept: FAMILY MEDICINE CLINIC | Facility: CLINIC | Age: 81
End: 2021-01-01
Payer: COMMERCIAL

## 2021-01-01 ENCOUNTER — APPOINTMENT (OUTPATIENT)
Dept: PHYSICAL THERAPY | Facility: CLINIC | Age: 81
End: 2021-01-01
Payer: COMMERCIAL

## 2021-01-01 ENCOUNTER — OFFICE VISIT (OUTPATIENT)
Dept: PHYSICAL THERAPY | Facility: CLINIC | Age: 81
End: 2021-01-01
Payer: COMMERCIAL

## 2021-01-01 ENCOUNTER — EVALUATION (OUTPATIENT)
Dept: PHYSICAL THERAPY | Facility: CLINIC | Age: 81
End: 2021-01-01
Payer: COMMERCIAL

## 2021-01-01 ENCOUNTER — OFFICE VISIT (OUTPATIENT)
Dept: NEUROLOGY | Facility: CLINIC | Age: 81
End: 2021-01-01
Payer: COMMERCIAL

## 2021-01-01 ENCOUNTER — CLINICAL SUPPORT (OUTPATIENT)
Dept: FAMILY MEDICINE CLINIC | Facility: CLINIC | Age: 81
End: 2021-01-01
Payer: COMMERCIAL

## 2021-01-01 ENCOUNTER — TRANSITIONAL CARE MANAGEMENT (OUTPATIENT)
Dept: FAMILY MEDICINE CLINIC | Facility: CLINIC | Age: 81
End: 2021-01-01

## 2021-01-01 ENCOUNTER — TELEPHONE (OUTPATIENT)
Dept: OTHER | Facility: OTHER | Age: 81
End: 2021-01-01

## 2021-01-01 ENCOUNTER — TELEPHONE (OUTPATIENT)
Dept: NEUROLOGY | Facility: CLINIC | Age: 81
End: 2021-01-01

## 2021-01-01 VITALS
SYSTOLIC BLOOD PRESSURE: 128 MMHG | HEIGHT: 68 IN | WEIGHT: 214.6 LBS | DIASTOLIC BLOOD PRESSURE: 60 MMHG | BODY MASS INDEX: 32.52 KG/M2 | HEART RATE: 77 BPM

## 2021-01-01 VITALS — SYSTOLIC BLOOD PRESSURE: 126 MMHG | DIASTOLIC BLOOD PRESSURE: 82 MMHG | TEMPERATURE: 97.7 F

## 2021-01-01 VITALS — SYSTOLIC BLOOD PRESSURE: 130 MMHG | HEART RATE: 72 BPM | DIASTOLIC BLOOD PRESSURE: 61 MMHG

## 2021-01-01 VITALS
TEMPERATURE: 98 F | BODY MASS INDEX: 29.7 KG/M2 | RESPIRATION RATE: 16 BRPM | SYSTOLIC BLOOD PRESSURE: 100 MMHG | HEART RATE: 68 BPM | DIASTOLIC BLOOD PRESSURE: 60 MMHG | HEIGHT: 68 IN | WEIGHT: 196 LBS

## 2021-01-01 DIAGNOSIS — G20 PARKINSON'S DISEASE (HCC): Primary | ICD-10-CM

## 2021-01-01 DIAGNOSIS — G20 PARKINSON'S DISEASE (HCC): ICD-10-CM

## 2021-01-01 DIAGNOSIS — R13.11 ORAL PHASE DYSPHAGIA: ICD-10-CM

## 2021-01-01 DIAGNOSIS — I10 BENIGN ESSENTIAL HYPERTENSION: ICD-10-CM

## 2021-01-01 DIAGNOSIS — I16.0 HYPERTENSIVE URGENCY: ICD-10-CM

## 2021-01-01 DIAGNOSIS — R07.89 OTHER CHEST PAIN: Primary | ICD-10-CM

## 2021-01-01 DIAGNOSIS — Z23 NEED FOR INFLUENZA VACCINATION: Primary | ICD-10-CM

## 2021-01-01 DIAGNOSIS — I25.10 ARTERIOSCLEROTIC CORONARY ARTERY DISEASE: ICD-10-CM

## 2021-01-01 PROCEDURE — 97112 NEUROMUSCULAR REEDUCATION: CPT

## 2021-01-01 PROCEDURE — 97530 THERAPEUTIC ACTIVITIES: CPT

## 2021-01-01 PROCEDURE — 1160F RVW MEDS BY RX/DR IN RCRD: CPT | Performed by: PHYSICIAN ASSISTANT

## 2021-01-01 PROCEDURE — 97110 THERAPEUTIC EXERCISES: CPT | Performed by: PHYSICAL THERAPIST

## 2021-01-01 PROCEDURE — 97110 THERAPEUTIC EXERCISES: CPT

## 2021-01-01 PROCEDURE — 99214 OFFICE O/P EST MOD 30 MIN: CPT | Performed by: PHYSICIAN ASSISTANT

## 2021-01-01 PROCEDURE — 1036F TOBACCO NON-USER: CPT | Performed by: PHYSICIAN ASSISTANT

## 2021-01-01 PROCEDURE — 97163 PT EVAL HIGH COMPLEX 45 MIN: CPT | Performed by: PHYSICAL THERAPIST

## 2021-01-01 PROCEDURE — 1036F TOBACCO NON-USER: CPT | Performed by: PSYCHIATRY & NEUROLOGY

## 2021-01-01 PROCEDURE — 3078F DIAST BP <80 MM HG: CPT | Performed by: PHYSICIAN ASSISTANT

## 2021-01-01 PROCEDURE — 97112 NEUROMUSCULAR REEDUCATION: CPT | Performed by: PHYSICAL THERAPIST

## 2021-01-01 PROCEDURE — G0008 ADMIN INFLUENZA VIRUS VAC: HCPCS

## 2021-01-01 PROCEDURE — 99496 TRANSJ CARE MGMT HIGH F2F 7D: CPT | Performed by: FAMILY MEDICINE

## 2021-01-01 PROCEDURE — 3075F SYST BP GE 130 - 139MM HG: CPT | Performed by: PHYSICIAN ASSISTANT

## 2021-01-01 PROCEDURE — 99214 OFFICE O/P EST MOD 30 MIN: CPT | Performed by: PSYCHIATRY & NEUROLOGY

## 2021-01-01 PROCEDURE — 97530 THERAPEUTIC ACTIVITIES: CPT | Performed by: PHYSICAL THERAPIST

## 2021-01-01 PROCEDURE — 97116 GAIT TRAINING THERAPY: CPT | Performed by: PHYSICAL THERAPIST

## 2021-01-01 PROCEDURE — 97116 GAIT TRAINING THERAPY: CPT

## 2021-01-01 PROCEDURE — 97150 GROUP THERAPEUTIC PROCEDURES: CPT | Performed by: PHYSICAL THERAPIST

## 2021-01-01 PROCEDURE — 90662 IIV NO PRSV INCREASED AG IM: CPT

## 2021-01-01 PROCEDURE — 3074F SYST BP LT 130 MM HG: CPT | Performed by: PHYSICIAN ASSISTANT

## 2021-01-01 PROCEDURE — 1160F RVW MEDS BY RX/DR IN RCRD: CPT | Performed by: PSYCHIATRY & NEUROLOGY

## 2021-01-01 RX ORDER — DONEPEZIL HYDROCHLORIDE 10 MG/1
TABLET, FILM COATED ORAL
Qty: 30 TABLET | Refills: 3 | Status: SHIPPED | OUTPATIENT
Start: 2021-01-01 | End: 2022-01-01

## 2021-01-01 RX ORDER — RASAGILINE 1 MG/1
TABLET ORAL
Qty: 90 TABLET | Refills: 3 | Status: SHIPPED | OUTPATIENT
Start: 2021-01-01 | End: 2022-01-01 | Stop reason: SDUPTHER

## 2021-02-17 ENCOUNTER — OFFICE VISIT (OUTPATIENT)
Dept: NEUROLOGY | Facility: CLINIC | Age: 81
End: 2021-02-17
Payer: COMMERCIAL

## 2021-02-17 ENCOUNTER — TELEPHONE (OUTPATIENT)
Dept: NEUROLOGY | Facility: CLINIC | Age: 81
End: 2021-02-17

## 2021-02-17 VITALS
SYSTOLIC BLOOD PRESSURE: 139 MMHG | HEIGHT: 70 IN | HEART RATE: 73 BPM | DIASTOLIC BLOOD PRESSURE: 65 MMHG | WEIGHT: 220.1 LBS | BODY MASS INDEX: 31.51 KG/M2

## 2021-02-17 DIAGNOSIS — G20 PARKINSON'S DISEASE (HCC): Primary | ICD-10-CM

## 2021-02-17 PROCEDURE — 1160F RVW MEDS BY RX/DR IN RCRD: CPT | Performed by: PHYSICIAN ASSISTANT

## 2021-02-17 PROCEDURE — 1036F TOBACCO NON-USER: CPT | Performed by: PHYSICIAN ASSISTANT

## 2021-02-17 PROCEDURE — 99215 OFFICE O/P EST HI 40 MIN: CPT | Performed by: PHYSICIAN ASSISTANT

## 2021-02-17 RX ORDER — ENTACAPONE 200 MG/1
TABLET ORAL
Qty: 150 TABLET | Refills: 3 | Status: SHIPPED | OUTPATIENT
Start: 2021-02-17 | End: 2021-05-18

## 2021-02-17 NOTE — PROGRESS NOTES
Patient ID: Bharath Toure is a [de-identified] y o  male  Assessment/Plan:    Parkinson's disease ([facility] [address] )  Patient with some progression and continued freezing of gait  Patient denies any clear benefit with the Zonisamide and feels that perhaps it is causing increased stiffness  Because of this he would like to come off of the medication  Will have him take 1tab at night for a week then STOP  In the past he was on higher doses of Sinemet however he and his wife feel that this made his freezing episodes worse  He is very hesitant to increase his Sinemet dose because of this  Once off of the Zonsiamide he will start a trial of Comtan  He will start by taking 1 tab along with Sinemet at 6:00 a m , noon and 6:00 p m   If he is doing well with this combination after 1 week he can increase to taking the Comtan along with each Sinemet dose throughout the day  He was encouraged to remain active  He would benefit from formal PT for his balance, gait and techniques for when he freezes  He no longer drives and it is a hardship for him to get out of house given his gait and freezing  Subjective:    Bharath Toure is an [de-identified] y o  retired  with prostate cancer on hormonal treatment, coronary artery disease s/p CABG x3 on 11/5/19, and Parkinson's disease who presents for follow up  To review, symptoms began with right hand rest tremor and decrease in fine motor movements which progressed to include right lower extremity tremor and slowness in movements  He was diagnosed August 2010 and started on Sinemet 25/100 tid and Azilect  Previous trial of ropinirole ER was associated with side effects  In the past increased doses of Sinemet made his freezing worse  At his last visit he had improvement of his freezing on lower doses of Sinemet however his tremors, bradykinesia, and rigidity were worse  Zonisamide was added  He was also sent for a FTD evaluation       INTERVAL HISTORY:  He overall feels very stiff especially if he sits for any period of time  He feels the Sinemet kicks in after about 15 min  He will start wearing off a few min to an hr prior to his next dose  If he is busy he wears off quicker  He will still freeze in certain situations such as through doorways or in crowds  He does not have any issues with going up and down stairs  He is able to dress in the AM   He can shower however it takes a while  His wife will help with dressing at times  He can brush his teeth  No issues with swallowing  He drools at times  He still does the business financials  He feels that his memory is still good  No hallucinations  He is no longer driving  Current medications:  Sinemet 1 tab 6am, 9am, noon, 3pm, 6pm and an extra tab at 3am if he wakes up  Azilect 1mg daily   Zonisamide 25mg 2tabs qhs       I personally reviewed and updated the ROS  Total time spent today was 45 minutes  Greater than 50% of total time was spent with the patient and / or family counseling and / or coordinating plan of care  Objective:    Blood pressure 139/65, pulse 73, height 5' 10" (1 778 m), weight 99 8 kg (220 lb 1 6 oz)  Physical Exam  HENT:      Left Ear: Hearing normal    Eyes:      Extraocular Movements: Extraocular movements intact  Pupils: Pupils are equal, round, and reactive to light  Pulmonary:      Effort: Pulmonary effort is normal    Neurological:      Mental Status: He is alert  Deep Tendon Reflexes: Strength normal    Psychiatric:         Speech: Speech normal          Neurological Exam  Mental Status  Alert  Oriented only to person, place and situation  Speech is normal  Speech: hypophonia  Language is fluent with no aphasia  Cranial Nerves  CN III, IV, VI: Extraocular movements intact bilaterally  Pupils equal round and reactive to light bilaterally  CN V:  Right: Facial sensation is normal   Left: Facial sensation is normal on the left    CN VIII:  Right: Hearing is decreased  Left: Hearing is normal   Patient wearing face mask   Decreased left shoulder shrug   Motor   Normal muscle tone  Strength is 5/5 throughout all four extremities  Sensory  Light touch is normal in upper and lower extremities  Reflexes  Glabellar tap present  Coordination  Right: Finger-to-nose normal  Rapid alternating movement abnormality:  Left: Finger-to-nose normal  Rapid alternating movement abnormality:  See MDS UPDRS III  Gait  Casual gait: Abnormal pull test  Recovered in 4 steps    Able to rise from chair without using arms  Moderate stooped posture  Significant difficulty with freezing of gait especially through doorways home with turns  He did however also have significant difficulty with walking straight down the hallway  Walking with a walking stick         MDS UPDRS III                               9/8/20 2/17/21   Time since last dose: Due for meds in 30 min  1 5 hrs   Speech  2  2   Facial Expression        Rigidity - Neck  0  0   Rigidity - Upper Extremity (Right)  2  1   Rigidity - Upper Extremity (Left)   1  1   Rigidity - Lower Extremity (Right)  2  1   Rigidity - Lower Extremity (Left)   0  0   Finger Taps (Right)   2  2   Finger Taps (Left)   1  1   Hand Movement (Right)  2  2   Hand Movement (Left)   1  1   Pronation/Supination (Right)  2  2   Pronation/Supination (Left)   2  2   Toe Tapping (Right) 3  2   Toe Tapping (Left) 2  3   Leg Agility (Right)  2  2   Leg Agility (Left)   2  2   Arising from Chair   2  2   Gait   2  2   Freezing of Gait 0  3   Postural Stability   1     Posture 3  3   Global spontaneity of movement 3  3   Postural Tremor (Right) 0 0   Postural Tremor (Left) 0 0   Kinetic Tremor (Right)  0 0   Kinetic Tremor (Left)  0  0   Rest tremor amplitude RUE 3  2   Rest tremor amplitude LUE 1  1   Rest tremor amplitude RLE 0  0   Reset tremor amplitude LLE 0  0   Lip/Jaw Tremor  2             Motor Exam Total:            ROS:    Review of Systems Constitutional: Negative  Negative for appetite change and fever  HENT: Positive for ear pain (Left) and hearing loss  Negative for tinnitus, trouble swallowing and voice change  Eyes: Negative  Negative for photophobia and pain  Respiratory: Negative  Negative for shortness of breath  Cardiovascular: Negative  Negative for palpitations  Gastrointestinal: Negative  Negative for nausea and vomiting  Endocrine: Negative  Negative for cold intolerance  Genitourinary: Negative  Negative for dysuria, frequency and urgency  Musculoskeletal: Positive for gait problem  Negative for myalgias and neck pain  Stiffness   Skin: Negative  Negative for rash  Neurological: Positive for tremors  Negative for dizziness, seizures, syncope, facial asymmetry, speech difficulty, weakness, light-headedness, numbness and headaches  Hematological: Negative  Does not bruise/bleed easily  Psychiatric/Behavioral: Negative  Negative for confusion, hallucinations and sleep disturbance

## 2021-02-17 NOTE — Clinical Note
Peng, I was just wondering if you could look into perhaps home therapy for this patient  He would benefit from the BIG therapy and I am not sure if there is a home service for this  If not they would be open to getting to the outpatient center  Thanks!

## 2021-02-17 NOTE — TELEPHONE ENCOUNTER
MSW retrieved a message from patient's wife returning this writer's call  MSW phoned patient's wife back at [phone]  MSW explained that the only one company that offers BIG does not accept patient's insurance  Patient's wife is agreeable with referral being sent to [facility] on [address] where he can receive outpatient PT for the BIG program  MSW will request script from [facility]

## 2021-02-17 NOTE — ASSESSMENT & PLAN NOTE
Patient with some progression and continued freezing of gait  Patient denies any clear benefit with the Zonisamide and feels that perhaps it is causing increased stiffness  Because of this he would like to come off of the medication  Will have him take 1tab at night for a week then STOP  In the past he was on higher doses of Sinemet however he and his wife feel that this made his freezing episodes worse  He is very hesitant to increase his Sinemet dose because of this  Once off of the Zonsiamide he will start a trial of Comtan  He will start by taking 1 tab along with Sinemet at 6:00 a m , noon and 6:00 p m   If he is doing well with this combination after 1 week he can increase to taking the Comtan along with each Sinemet dose throughout the day  He was encouraged to remain active  He would benefit from formal PT for his balance, gait and techniques for when he freezes  He no longer drives and it is a hardship for him to get out of house given his gait and freezing

## 2021-02-17 NOTE — TELEPHONE ENCOUNTER
The only in home provider of [facility] BIG is [facility]  MSW will do benefits investigation to see if patient has coverage to use [facility]

## 2021-02-17 NOTE — TELEPHONE ENCOUNTER
Benefits investigation showed that [facility] does not accept patient's insurance  The Social Work Team is not aware of any other agencies that can provide in home BIG therapy  MSW is aware that other in home therapy programs may be able to provide components of BIG but not the complete program  If patient is interested in the complete program, he would need to go to an outpatient neuro rehab clinic to receive same  MSW attempted to reach patient to inform him of above at [phone] and [phone]  No answer at either number  MSW left messages requesting callback  Awaiting same

## 2021-02-17 NOTE — TELEPHONE ENCOUNTER
MSW received the following message from Shen Steiner PA-C:    "Peng, I was just wondering if you could look into perhaps home therapy for this patient   He would benefit from the BIG therapy and I am not sure if there is a home service for this  Gaffney Brian not they would be open to getting to the outpatient center  [place]!"

## 2021-02-17 NOTE — PATIENT INSTRUCTIONS
Patient does not notice any clear benefit with the Zonisamide and feels that perhaps it is causing increased stiffness  Because of this he would like to come off of the medication  Will have him take 1tab at night for a week then STOP  Once off of the Zonsiamide he will start a trial of Comtan  He will start by taking 1 tab along with Sinemet at 6:00 a m , noon and 6:00 p m   If he is doing well with this combination after 1 week he can increase to taking the Comtan along with each Sinemet dose throughout the day  He was encouraged to remain active  He would benefit from formal PT for his balance, gait and techniques for when he freezes  He no longer drives and it is a hardship for him to get out of house given his gait and freezing

## 2021-02-22 NOTE — TELEPHONE ENCOUNTER
Patient is scheduled for his PT eval with a [facility] BIG certified instructor on 3/1/21

## 2021-03-01 ENCOUNTER — EVALUATION (OUTPATIENT)
Dept: PHYSICAL THERAPY | Facility: CLINIC | Age: 81
End: 2021-03-01
Payer: COMMERCIAL

## 2021-03-01 DIAGNOSIS — R26.9 GAIT ABNORMALITY: Primary | ICD-10-CM

## 2021-03-01 DIAGNOSIS — G20 PARKINSON'S DISEASE (HCC): ICD-10-CM

## 2021-03-01 PROCEDURE — 97162 PT EVAL MOD COMPLEX 30 MIN: CPT | Performed by: PHYSICAL THERAPIST

## 2021-03-01 NOTE — PROGRESS NOTES
PT Evaluation     Today's date: 3/1/2021  Patient name: Khushboo Briceno  : 1940  MRN: [id number]  Referring provider: Danis Tijerina PA-C  Dx:   Encounter Diagnosis     ICD-10-CM    1  Parkinson's disease ([address] )  [address] Ambulatory referral to Physical Therapy                  Assessment  Assessment details: Pt is a [de-identified]year old male referred for the LSVT program secondary to his Parkinson's diagnosis 10 years ago  Pt presented today with impairments in decreased standing balance, decreased BLE strength, decreased B hand fine motor movements, decreased endurance, (+) fall risk per his TUG and 5xSTS scores all which limit him functionally with gait, transfers, and elevations  Pt will benefit from PT services in the LSVT program needed to address above impairments and decreased fall risk  Impairments: abnormal gait, abnormal or restricted ROM, activity intolerance, impaired balance, impaired physical strength, lacks appropriate home exercise program, pain with function, safety issue and poor posture   Understanding of Dx/Px/POC: good   Prognosis: good    Goals  ST  Pt will demonstrate independence with HEP within 2 weeks  2  Pt will improve 5xSTS score by 5 seconds reducing fall risk within 2 weeks  3  Pt will improve ABC score by at least 75% within 2 weeks  4  Pt will improve 6MWT by at least 200 feet within 2 weeks  LT  Pt will improve gait speed to at least 1 22 m/s with least restrictive device needed for safe community mobility within 4 weeks  2  Pt will improve TUG score to <19s needed to reduce fall risk within 4 weeks  3  Pt will imptove 5xSTS score to <15 sec needed to reduce fall risk within 4 weeks  4  Pt will demonstrate independence with HEP within 4 weeks  5  Pt will improve 6MWT by at least 400' within 4 weeks      Plan  Patient would benefit from: skilled physical therapy  Planned therapy interventions: patient education, balance, neuromuscular re-education, therapeutic activities, therapeutic exercise, home exercise program, gait training, strengthening, stretching and postural training  Frequency: 4x week  Duration in weeks: 4  Plan of Care beginning date: 3/1/2021  Plan of Care expiration date: 4/2/2021  Treatment plan discussed with: patient        Subjective Evaluation    History of Present Illness  Mechanism of injury: Was diagnosed with Parkinson's in August 2010  Did the LSVT program years ago, liked it  Has TM and bike at home, does it 2-3 times a week, also does stretches  Doesn't remember the exercises from the program   Has a hard time getting up from laying down  Has a hard time with his endurance, can't walk outside for a long  Has a walking stick all the time, most of the time carries it  Has a hard time walking through door  Right hand shakes/tremor  Does freeze and shuffle  Pain  No pain reported    Social Support  Steps to enter house: yes  Stairs in house: yes   Lives in: multiple-level home  Lives with: spouse    Working: yes and no, 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  Hand dominance: right    Patient Goals  Patient goal: "getting up and getting moving without freezing"        Objective     AROM: limited cervical/thoracic/lumbar mobility, BUE/BLEs limited by [facility] [place] [facility] [place]    Posture: Forward head, rounded shoulders    Coordination  Finger to Nose: NT  Rapid forearm supination and pronation: NT  Heel on Shin: NT    Sit to Stand Assessment  5xSTS score (time): 27 sec    Gait Assessments  1  Timed Up and Go (TUG)   TUG Score: 23 5 sec without AD   TUG Manual Score: 27 5 sec without AD   TUG Cognitive Score: 31 25 sec without AD  2  6 minute walk test score: 400' with walking stick and supervision  3   Gait speed:  59 m/s with walking stick   Deviations: shuffled (worse with turning), decreased step length B, decreased terminal hip ext B, forward posture, decreased arm swing B  4: Functional Gait Assessment (FGA) <22/30 indicates increased risk for falls: NT    Hand Function Assessment  9 Hole Peg Test Score: L hand 35 sec; R hand 45 sec    Other  1  Modified Clinical Test of Sensory Interaction on Balance  eyes open firm surface: 30s  eyes closed firm surface: 30s  eyes open foam surface: 30s  eyes closed foam surface: 7s  2  Activities Specific Balance Confidence Scale (ABC) score: 68%    Identified Functional Component Movements (5)  1  Sit to Stand  2  Supine to sit  3  Donning jacket  4  Turning  5  Handwriting    Identified Hierarchies (1-3)  1  Walking through a door  2  Being in crowds  3  Flowsheet Rows      Most Recent Value   PT/OT G-Codes   Current Score  68   Projected Score  0             Precautions: Parkinson's, fall risk  LSVT BIG Daily Treatment Diary      Carryover Assignment                                                                                      Max Daily Exercises             Floor to Ceiling             Side to Side             Forward Step and Reach             Sideways Step and Reach             Backwards Step and Reach             Forwards Rock and Reach             Sideways Rock and Reach             Functional Components             1   STS             2               3              4              5              Hierarchies             1              2              BIG walking

## 2021-03-03 ENCOUNTER — OFFICE VISIT (OUTPATIENT)
Dept: PHYSICAL THERAPY | Facility: CLINIC | Age: 81
End: 2021-03-03
Payer: COMMERCIAL

## 2021-03-03 DIAGNOSIS — R26.9 GAIT ABNORMALITY: ICD-10-CM

## 2021-03-03 DIAGNOSIS — G20 PARKINSON'S DISEASE (HCC): Primary | ICD-10-CM

## 2021-03-03 PROCEDURE — 97116 GAIT TRAINING THERAPY: CPT | Performed by: PHYSICAL THERAPIST

## 2021-03-03 PROCEDURE — 97112 NEUROMUSCULAR REEDUCATION: CPT | Performed by: PHYSICAL THERAPIST

## 2021-03-03 NOTE — PROGRESS NOTES
Daily Note     Today's date: 3/3/2021  Patient name: Mina Oneal  : 1940  MRN: [id number]  Referring provider: Salomon Lozano PA-C  Dx:   Encounter Diagnosis     ICD-10-CM    1  Parkinson's disease ([facility] [address] )  G20    2  Gait abnormality  R26 9                   Subjective: No complaints upon arrival   Back feeling ok  Felt ok after eval, no increases in pain  Objective: See treatment diary below    Assessment:  Initiated LSVT BIG program today, issued modified pictures with chair for home  Chair near pt during session however not used  Tolerated treatment fair, limited due to fatigue requiring increased time to complete MDEs  Decreased arm swing during gait, L>R, with cues corrects however has difficulty maintaining  Patient would benefit from continued PT    Plan: Add more functional components     Precautions: Parkinson's, fall risk  LSVT BIG Daily Treatment Diary      Carryover Assignment                                                                                      Max Daily Exercises  3/3/21           Floor to Ceiling  x10 with 10s holds           Side to Side   x10 with 10s holds           Forward Step and Reach  x10 R & L           Sideways Step and Reach   x10 R & L           Backwards Step and Reach   x10 R & L           Forwards Rock and Reach   x10 R & L           Sideways Rock and Reach  x10 R & L           Functional Components             1   STS  normal chair x10 CGA           2               3              4              5              Hierarchies             1              2              BIG walking               in rose fwd x10min without AD

## 2021-03-04 ENCOUNTER — OFFICE VISIT (OUTPATIENT)
Dept: PHYSICAL THERAPY | Facility: CLINIC | Age: 81
End: 2021-03-04
Payer: COMMERCIAL

## 2021-03-04 DIAGNOSIS — R26.9 GAIT ABNORMALITY: ICD-10-CM

## 2021-03-04 DIAGNOSIS — G20 PARKINSON'S DISEASE (HCC): Primary | ICD-10-CM

## 2021-03-04 PROCEDURE — 97112 NEUROMUSCULAR REEDUCATION: CPT | Performed by: PHYSICAL THERAPIST

## 2021-03-04 PROCEDURE — 97116 GAIT TRAINING THERAPY: CPT | Performed by: PHYSICAL THERAPIST

## 2021-03-04 NOTE — PROGRESS NOTES
Daily Note     Today's date: 3/4/2021  Patient name: Cheryle Magana  : 1940  MRN: [id number]  Referring provider: Tobias Watt PA-C  Dx:   Encounter Diagnosis     ICD-10-CM    1  Parkinson's disease ([address] )  G20    2  Gait abnormality  R26 9                   Subjective: Reports compliance with HEP  Was tired after last session  Back feeling ok  Objective: See treatment diary below    Assessment:  Able to complete MDEs in less time today, requires frequent cues and shaping for proper form  Increased difficulty with turning activity due to shuffling gait and freezing, with cues to march improved slightly however still required increased amount of steps to complete a full turn  Limited by fatigue requiring increased time to complete exercises and frequent rest breaks  Patient would benefit from continued PT    Plan: Add handwriting activities NV  Precautions: Parkinson's, fall risk  LSVT BIG Daily Treatment Diary      Carryover Assignment                                                                                      Max Daily Exercises  3/3/21  3         Floor to Ceiling  x10 with 10s holds  x10 with 10s holds         Side to Side   x10 with 10s holds  x10 with 10s holds         Forward Step and Reach  x10 R & L  x10 R & L         Sideways Step and Reach   x10 R & L  x10 R & L         Backwards Step and Reach   x10 R & L  x10 R & L         Forwards Rock and Reach   x10 R & L  x10 R & L         Sideways Rock and Reach  x10 R & L  x10 R & L         Functional Components             1  STS  normal chair x10 CGA  mat table x10 CGA         2  Coat donning/doffing   x5         3  Stairs   6" step-ups with B Hrs x20 fwd         4  Handwriting             5  Turning    STS with 360 turns x5         Hierarchies             1  Walking through a door             2   Walking in crowds             BIG walking               in rose fwd x10min without AD  bwd & side // bars 2 laps ea; fwd in rose 6x40'

## 2021-03-05 ENCOUNTER — OFFICE VISIT (OUTPATIENT)
Dept: PHYSICAL THERAPY | Facility: CLINIC | Age: 81
End: 2021-03-05
Payer: COMMERCIAL

## 2021-03-05 DIAGNOSIS — R26.9 GAIT ABNORMALITY: ICD-10-CM

## 2021-03-05 DIAGNOSIS — G20 PARKINSON'S DISEASE (HCC): Primary | ICD-10-CM

## 2021-03-05 PROCEDURE — 97116 GAIT TRAINING THERAPY: CPT

## 2021-03-05 PROCEDURE — 97112 NEUROMUSCULAR REEDUCATION: CPT

## 2021-03-05 NOTE — PROGRESS NOTES
Daily Note     Today's date: 3/5/2021  Patient name: Anna Luong  : 1940  MRN: [id number]  Referring provider: Nazario Fung PA-C  Dx:   Encounter Diagnosis     ICD-10-CM    1  Parkinson's disease ([address] )  G20    2  Gait abnormality  R26 9        Start Time: 0900  Stop Time: 1000  Total time in clinic (min): 60 minutes    Subjective: Presents to his OP PT session feeling well today; states his R knee was bothering him last night but that has since resolved  Is not having any L/S discomfort today  Objective: See treatment diary below      Assessment: Reduced global fatigue today with MDE's requiring less frequent seated rest breaks and ability to complete faster  Continues to require VC for shaping and proper form, although improved form quality noted since yesterday's session  Trialed wrist weights with BIG ambulation to reduce arm swing; improvements in alternating UE/LE coordination noted once weights were doffed  Noted fatigue post-session with ambulatory activities however was able to complete out of // bars today with no signs of LOB  Would continue to benefit from skilled PT care to maximize functional mobility and quality of life with LSVT-BIG programming  Plan: Continue per plan of care  Progress treatment as tolerated  Progress MDE intensity as tolerated       Precautions: Parkinson's, fall risk  LSVT BIG Daily Treatment Diary      Carryover Assignment                                                                                      Max Daily Exercises  3/3/21  3/4  3/5       Floor to Ceiling  x10 with 10s holds  x10 with 10s holds x10 with 10s holds       Side to Side   x10 with 10s holds  x10 with 10s holds  x10 with 10s holds       Forward Step and Reach  x10 R & L  x10 R & L  x10 R & L       Sideways Step and Reach   x10 R & L  x10 R & L  x10 R & L       Backwards Step and Reach   x10 R & L  x10 R & L x10 R & L       Forwards Rock and Reach   x10 R & L  x10 R & L x10 R & L     [address]       Functional Components             1  STS  normal chair x10 CGA  mat table x10 CGA  mat table  x10 CSA        2  Coat donning/doffing   x5  x5       3  Stairs   6" step-ups with B Hrs x20 fwd  Trainers ascending/descending, UL UE support x8 laps        4  Handwriting             5  Turning    STS with 360 turns x5 STS turns w/HKM x5 ea        Hierarchies             1  Walking through a door             2   Walking in crowds             BIG walking               in rose fwd x10min without AD  bwd & side // bars 2 laps ea; fwd in rose 6x40'  Hallway:  Fwd: 2x40' no wrist weights   1x40' trial 2 5# BL wrist weights    Side: x30' ea

## 2021-03-08 ENCOUNTER — OFFICE VISIT (OUTPATIENT)
Dept: PHYSICAL THERAPY | Facility: CLINIC | Age: 81
End: 2021-03-08
Payer: COMMERCIAL

## 2021-03-08 DIAGNOSIS — R26.9 GAIT ABNORMALITY: ICD-10-CM

## 2021-03-08 DIAGNOSIS — G20 PARKINSON'S DISEASE (HCC): Primary | ICD-10-CM

## 2021-03-08 PROCEDURE — 97112 NEUROMUSCULAR REEDUCATION: CPT | Performed by: PHYSICAL THERAPIST

## 2021-03-08 PROCEDURE — 97116 GAIT TRAINING THERAPY: CPT | Performed by: PHYSICAL THERAPIST

## 2021-03-08 NOTE — PROGRESS NOTES
Daily Note     Today's date: 3/8/2021  Patient name: Windy Watson  : 1940  MRN: [id number]  Referring provider: Merceda Gaucher, PA-C  Dx:   Encounter Diagnosis     ICD-10-CM    1  Parkinson's disease ([facility] [address] )  G20    2  Gait abnormality  R26 9                   Subjective: No changes or complaints upon arrival     Objective: See treatment diary below      Assessment: Had increased difficulty with clips today due to UE weakness and hand coordination  Overall improved tolerance for session as seen by less fatigue with MDEs  Plan: Add flicks with seated MDEs NV as pt able  Precautions: Parkinson's, fall risk  LSVT BIG Daily Treatment Diary      Carryover Assignment                                                                                      Max Daily Exercises  3/3/21  3/4  3/5  3/8     Floor to Ceiling  x10 with 10s holds  x10 with 10s holds x10 with 10s holds  x10 with 10s holds     Side to Side   x10 with 10s holds  x10 with 10s holds  x10 with 10s holds  x10 with 10s holds     Forward Step and Reach  x10 R & L  x10 R & L  x10 R & L  x10 R & L     Sideways Step and Reach   x10 R & L  x10 R & L  x10 R & L  x10 R & L     Backwards Step and Reach   x10 R & L  x10 R & L x10 R & L  x10 R & L     Forwards Rock and Reach   x10 R & L  x10 R & L x10 R & L  x10 R & L     Sideways Rock and Reach  x10 R & L  x10 R & L  x10 R & L  x10 R & L     Functional Components             1  STS  normal chair x10 CGA  mat table x10 CGA  mat table  x10 CSA   mat table x20 distance supervision     2  Coat donning/doffing   x5  x5       3  Stairs   6" step-ups with B Hrs x20 fwd  Trainers ascending/descending, UL UE support x8 laps   6" step-ups with B Hrs x20 fwd     4  Handwriting        clipes greens and blues standing R & L     5  Turning    STS with 360 turns x5 STS turns w/HKM x5 ea   STS from chair to chair x10     Hierarchies             1  Walking through a door             2   Walking in crowds             BIG walking               in rose fwd x10min without AD  bwd & side // bars 2 laps ea; fwd in rose 6x40'  Hallway:  Fwd: 2x40' no wrist weights   1x40' trial 2 5# BL wrist weights    Side: x30' ea      // bars bwd and side 2 laps ea; hallway fwd 4x40'

## 2021-03-10 ENCOUNTER — OFFICE VISIT (OUTPATIENT)
Dept: PHYSICAL THERAPY | Facility: CLINIC | Age: 81
End: 2021-03-10
Payer: COMMERCIAL

## 2021-03-10 DIAGNOSIS — R26.9 GAIT ABNORMALITY: ICD-10-CM

## 2021-03-10 DIAGNOSIS — G20 PARKINSON'S DISEASE (HCC): Primary | ICD-10-CM

## 2021-03-10 PROCEDURE — 97112 NEUROMUSCULAR REEDUCATION: CPT | Performed by: PHYSICAL THERAPIST

## 2021-03-10 PROCEDURE — 97116 GAIT TRAINING THERAPY: CPT | Performed by: PHYSICAL THERAPIST

## 2021-03-10 NOTE — PROGRESS NOTES
Daily Note     Today's date: 3/10/2021  Patient name: Christopher Norris  : 1940  MRN: [id number]  Referring provider: Monie Segovia PA-C  Dx:   Encounter Diagnosis     ICD-10-CM    1  Parkinson's disease ([address] )  G20    2  Gait abnormality  R26 9                   Subjective: Continues with some R knee pain over night, but he ices and by morning is fine  With complaints of fatigue today  Objective: See treatment diary below      Assessment: Added flicks to seated MDEs per plan with difficulty however able to complete with cues  Continues to be limited by fatigue but each session improves as seen by shorter rest periods  Continues with increased amount of steps with turning however foot clearance is improving  Plan: Continue with flicks during seated MDEs  Precautions: Parkinson's, fall risk  LSVT BIG Daily Treatment Diary      Carryover Assignment                                                                                      Max Daily Exercises  3/3/21  3/4  3/5  3/8  3/10   Floor to Ceiling  x10 with 10s holds  x10 with 10s holds x10 with 10s holds  x10 with 10s holds  P39 with 10 flicks   Side to Side   x10 with 10s holds  x10 with 10s holds  x10 with 10s holds  x10 with 10s holds  V00 with 10 flicks   Forward Step and Reach  x10 R & L  x10 R & L  x10 R & L  x10 R & L   x10 R & L   Sideways Step and Reach   x10 R & L  x10 R & L  x10 R & L  x10 R & L   x10 R & L   Backwards Step and Reach   x10 R & L  x10 R & L x10 R & L  x10 R & L   x10 R & L   Forwards Rock and Reach   x10 R & L  x10 R & L x10 R & L  x10 R & L   x10 R & L   Sideways Rock and Reach  x10 R & L  x10 R & L  x10 R & L  x10 R & L   x10 R & L   Functional Components             1  STS  normal chair x10 CGA  mat table x10 CGA  mat table  x10 CSA   mat table x20 distance supervision   mat table x20 distance supervision   2  Coat donning/doffing   x5  x5       3   Stairs   6" step-ups with B Hrs x20 fwd  Trainers ascending/descending, UL UE support x8 laps   6" step-ups with B Hrs x20 fwd   6" step-ups with B Hrs x20 fwd   4  Handwriting        clipes greens and blues standing R & L     5  Turning    STS with 360 turns x5 STS turns w/HKM x5 ea   STS from chair to chair x10   STS with 360 turns x5   Hierarchies             1  Walking through a door             2   Walking in crowds             BIG walking               in rose fwd x10min without AD  bwd & side // bars 2 laps ea; fwd in rose 6x40'  Hallway:  Fwd: 2x40' no wrist weights   1x40' trial 2 5# BL wrist weights    Side: x30' ea      // bars bwd and side 2 laps ea; hallway fwd 4x40'  in rose  bwd 2x40'  Side x40' ea  Fwd 6x40'  HKM 2x40'

## 2021-03-11 ENCOUNTER — OFFICE VISIT (OUTPATIENT)
Dept: PHYSICAL THERAPY | Facility: CLINIC | Age: 81
End: 2021-03-11
Payer: COMMERCIAL

## 2021-03-11 DIAGNOSIS — R26.9 GAIT ABNORMALITY: ICD-10-CM

## 2021-03-11 DIAGNOSIS — G20 PARKINSON'S DISEASE (HCC): Primary | ICD-10-CM

## 2021-03-11 PROCEDURE — 97112 NEUROMUSCULAR REEDUCATION: CPT | Performed by: PHYSICAL THERAPIST

## 2021-03-11 PROCEDURE — 97116 GAIT TRAINING THERAPY: CPT | Performed by: PHYSICAL THERAPIST

## 2021-03-11 NOTE — PROGRESS NOTES
Daily Note     Today's date: 3/11/2021  Patient name: Lorena Del Valle  : 1940  MRN: [id number]  Referring provider: Raya Torres PA-C  Dx:   Encounter Diagnosis     ICD-10-CM    1  Parkinson's disease ([address] )  G20    2  Gait abnormality  R26 9                   Subjective: No knee pain last night  Denies complaints upon arrival     Objective: See treatment diary below      Assessment: Continues with significant difficulty with bwd walking without AD in rose  With rhythmic rocking side to side freezing decreases however step length continues to be small  Is showing improved step height with turning however continues taking too many steps  Plan: Complete clips NV for hand coordination  Precautions: Parkinson's, fall risk  LSVT BIG Daily Treatment Diary      Carryover Assignment                                                                                      Max Daily Exercises 3/11/21  3/4  3/5  3/8  3/10   Floor to Ceiling  Y87 with 28V flicks  Z06 with 67H holds x10 with 10s holds  x10 with 10s holds  Q42 with 10 flicks   Side to Side   W34 with 23Q flicks  K64 with 38P holds  x10 with 10s holds  x10 with 10s holds  B68 with 10 flicks   Forward Step and Reach  x10 R & L  x10 R & L  x10 R & L  x10 R & L   x10 R & L   Sideways Step and Reach   x10 R & L  x10 R & L  x10 R & L  x10 R & L   x10 R & L   Backwards Step and Reach   x10 R & L  x10 R & L x10 R & L  x10 R & L   x10 R & L   Forwards Rock and Reach   x10 R & L  x10 R & L x10 R & L  x10 R & L   x10 R & L   Sideways Rock and Reach  x10 R & L  x10 R & L  x10 R & L  x10 R & L   x10 R & L   Functional Components             1  STS Mat table x20  mat table x10 CGA  mat table  x10 CSA   mat table x20 distance supervision   mat table x20 distance supervision   2  Coat donning/doffing   x5  x5       3   Stairs  8" // bars x20 fwd/side ea 6" step-ups with B Hrs x20 fwd  Trainers ascending/descending, UL UE support x8 laps   6" step-ups with B Hrs x20 fwd   6" step-ups with B Hrs x20 fwd   4  Handwriting        clipes greens and blues standing R & L     5  Turning    STS with 360 turns x5 STS turns w/HKM x5 ea   STS from chair to chair x10   STS with 360 turns x5   Hierarchies             1  Walking through a door             2   Walking in crowds             BIG walking               in rose fwd & bwd x10min without AD  bwd & side // bars 2 laps ea; fwd in rose 6x40'  Hallway:  Fwd: 2x40' no wrist weights   1x40' trial 2 5# BL wrist weights    Side: x30' ea      // bars bwd and side 2 laps ea; hallway fwd 4x40'  in rose  bwd 2x40'  Side x40' ea  Fwd 6x40'  HKM 2x40'

## 2021-03-12 ENCOUNTER — OFFICE VISIT (OUTPATIENT)
Dept: PHYSICAL THERAPY | Facility: CLINIC | Age: 81
End: 2021-03-12
Payer: COMMERCIAL

## 2021-03-12 DIAGNOSIS — G20 PARKINSON'S DISEASE (HCC): Primary | ICD-10-CM

## 2021-03-12 DIAGNOSIS — R26.9 GAIT ABNORMALITY: ICD-10-CM

## 2021-03-12 PROCEDURE — 97112 NEUROMUSCULAR REEDUCATION: CPT | Performed by: PHYSICAL THERAPIST

## 2021-03-12 PROCEDURE — 97116 GAIT TRAINING THERAPY: CPT | Performed by: PHYSICAL THERAPIST

## 2021-03-15 ENCOUNTER — EVALUATION (OUTPATIENT)
Dept: PHYSICAL THERAPY | Facility: CLINIC | Age: 81
End: 2021-03-15
Payer: COMMERCIAL

## 2021-03-15 DIAGNOSIS — G20 PARKINSON'S DISEASE (HCC): Primary | ICD-10-CM

## 2021-03-15 DIAGNOSIS — R26.9 GAIT ABNORMALITY: ICD-10-CM

## 2021-03-15 PROCEDURE — 97116 GAIT TRAINING THERAPY: CPT | Performed by: PHYSICAL THERAPIST

## 2021-03-15 PROCEDURE — 97112 NEUROMUSCULAR REEDUCATION: CPT | Performed by: PHYSICAL THERAPIST

## 2021-03-15 NOTE — PROGRESS NOTES
Progress Update Note     Today's date: 3/15/2021  Patient name: Shelley Lane  : 1940  MRN: [id number]  Referring provider: Liz Carlisle PA-C  Dx:   Encounter Diagnosis     ICD-10-CM    1  Parkinson's disease ([address] )  G20    2  Gait abnormality  R26 9                   Subjective: No complaints upon arrival   Continues with compliance with program  Thinks is definitely helping  Denies pain  Objective: See treatment diary below  6MWT: 650' holding walking stick distant supervision  5xSTS: 15 sec  Gait speed:  78 m/s without AD  ABC: 62 5 %    Assessment: Pt has made significant progress with all outcomes above except his ABC score (which demonstrated no change since eval)  Able to meet most of STGs  Will continue to benefit from LSVT program needed to address LTGs and continue to reduce fall risk  Plan: Will continue PT 4x/week finished second half of LSVT Big program      Precautions: Parkinson's, fall risk  LSVT BIG Daily Treatment Diary      Carryover Assignment                                                                                      Max Daily Exercises 3/11/21  3/12  3/15  3/8  3/10   Floor to Ceiling  B92 with 99N flicks  B84 with 15Q flicks G63 with 28U flicks  O95 with 51G holds  Y26 with 10 flicks   Side to Side   E08 with 83B flicks  Z04 with 82Y flicks  F29 with 42L flicks  M10 with 76K holds  L94 with 10 flicks   Forward Step and Reach  x10 R & L  x10 R & L  x10 R & L  x10 R & L   x10 R & L   Sideways Step and Reach   x10 R & L  x10 R & L  x10 R & L  x10 R & L   x10 R & L   Backwards Step and Reach   x10 R & L  x10 R & L x10 R & L  x10 R & L   x10 R & L   Forwards Rock and Reach   x10 R & L  x10 R & L x10 R & L  x10 R & L   x10 R & L   Sideways Rock and Reach  x10 R & L  x10 R & L  x10 R & L  x10 R & L   x10 R & L   Functional Components             1  STS Mat table x20  mat table x12  mat table  x20   mat table x20 distance supervision   mat table x20 distance supervision   2  Coat donning/doffing     x5       3  Stairs  8" // bars x20 fwd/side ea 8" step-ups with 1 Hrs x20 fwd  8" 1 HR x20  6" step-ups with B Hrs x20 fwd   6" step-ups with B Hrs x20 fwd   4  Handwriting    clips greens and blues standing R & L    clipes greens and blues standing R & L     5  Turning        STS from chair to chair x10   STS with 360 turns x5   Hierarchies             1  Walking through a door             2   Walking in crowds             BIG walking               in rose fwd & bwd x10min without AD TM x10 min 1 UE 1 5 mph bwd walking in rose      // bars bwd and side 2 laps ea; hallway fwd 4x40'  in rose  bwd 2x40'  Side x40' ea  Fwd 6x40'  HKM 2x40'

## 2021-03-17 ENCOUNTER — OFFICE VISIT (OUTPATIENT)
Dept: PHYSICAL THERAPY | Facility: CLINIC | Age: 81
End: 2021-03-17
Payer: COMMERCIAL

## 2021-03-17 DIAGNOSIS — G20 PARKINSON'S DISEASE (HCC): Primary | ICD-10-CM

## 2021-03-17 DIAGNOSIS — R26.9 GAIT ABNORMALITY: ICD-10-CM

## 2021-03-17 PROCEDURE — 97112 NEUROMUSCULAR REEDUCATION: CPT | Performed by: PHYSICAL THERAPIST

## 2021-03-17 PROCEDURE — 97116 GAIT TRAINING THERAPY: CPT | Performed by: PHYSICAL THERAPIST

## 2021-03-17 NOTE — PROGRESS NOTES
Daily Note     Today's date: 3/17/2021  Patient name: Mirella Cook  : 1940  MRN: [id number]  Referring provider: Maura Pierce PA-C  Dx:   Encounter Diagnosis     ICD-10-CM    1  Parkinson's disease ([address] )  G20    2  Gait abnormality  R26 9                   Subjective: Feeling good, no complaints    Objective: See treatment diary below    Assessment: Able to complete clips today at quicker speed showing progress with hand coordination  Continues to require cues for upright posture during session  Required frequent cues today as well with proper form for flicks to increase amplitude of movement  Added Sylvain with gait training on TM today in attempts to improve foot clearance during swing, required cues to improve heel strike B while on TM, able to complete however had difficulty maintaining due to decreased step length B  Minimal carryover noted with gait over ground today, however pt limited post session due to fatigue  Plan: Continue with Sylvain during gait training activities needed to promote better foot clearance during swing       Precautions: Parkinson's, fall risk  LSVT BIG Daily Treatment Diary      Carryover Assignment                                                                                      Max Daily Exercises 3/11/21  3/12  3/15  3/17  3/10   Floor to Ceiling  S37 with 88J flicks  D82 with 51D flicks N29 with 28X flicks  R35 with 45S flicks  P85 with 10 flicks   Side to Side   V30 with 29B flicks  V54 with 37R flicks  M21 with 77E flicks  U51 with 68A flicks  Q38 with 10 flicks   Forward Step and Reach  x10 R & L  x10 R & L  x10 R & L  x10 R & L   x10 R & L   Sideways Step and Reach   x10 R & L  x10 R & L  x10 R & L  x10 R & L   x10 R & L   Backwards Step and Reach   x10 R & L  x10 R & L x10 R & L  x10 R & L   x10 R & L   Forwards Rock and Reach   x10 R & L  x10 R & L x10 R & L  x10 R & L   x10 R & L   Sideways Rock and Reach  x10 R & L  x10 R & L  x10 R & L  x10 R & L   x10 R & L Functional Components             1  STS Mat table x20  mat table x12  mat table  x20   low chair x10 with CGA   mat table x20 distance supervision   2  Coat donning/doffing     x5       3  Stairs  8" // bars x20 fwd/side ea 8" step-ups with 1 Hrs x20 fwd  8" 1 HR x20  8" step-ups with 1 UE x20 fwd   6" step-ups with B Hrs x20 fwd   4  Handwriting    clips greens and blues standing R & L    clipes blacks and blues standing R & L     5  Turning           STS with 360 turns x5   Hierarchies             1  Walking through a door             2   Walking in crowds             BIG walking               in rose fwd & bwd x10min without AD TM x10 min 1 UE 1 5 mph bwd walking in rose     TM 1 5 mph 1-2 UE x10 min 2# AW  in rose  bwd 2x40'  Side x40' ea  Fwd 6x40'  HKM 2x40'

## 2021-03-18 ENCOUNTER — OFFICE VISIT (OUTPATIENT)
Dept: PHYSICAL THERAPY | Facility: CLINIC | Age: 81
End: 2021-03-18
Payer: COMMERCIAL

## 2021-03-18 DIAGNOSIS — G20 PARKINSON'S DISEASE (HCC): Primary | ICD-10-CM

## 2021-03-18 DIAGNOSIS — R26.9 GAIT ABNORMALITY: ICD-10-CM

## 2021-03-18 PROCEDURE — 97116 GAIT TRAINING THERAPY: CPT

## 2021-03-18 PROCEDURE — 97112 NEUROMUSCULAR REEDUCATION: CPT

## 2021-03-18 NOTE — PROGRESS NOTES
Daily Note     Today's date: 3/18/2021  Patient name: Khushboo Briceno  : 1940  MRN: [id number]  Referring provider: Danis Tijerina PA-C  Dx:   Encounter Diagnosis     ICD-10-CM    1  Parkinson's disease ([address] )  G20    2  Gait abnormality  R26 9          Subjective: Notes compliance with HEP "some days"  States that he is noticing improvements  Objective: See treatment diary below    Assessment: Patient challenged with STS from low chair today, but demo some improvement with cueing to rock forward  Patient requires cueing throughout MDEs for bigger amplitude movements, especially with picking up his feet  Cueing throughout for upright posture  Cueing required to pick of feet and increase step length at times with TM walking  Plan: Continue with Sylvain during gait training activities needed to promote better foot clearance during swing  Precautions: Parkinson's, fall risk  LSVT BIG Daily Treatment Diary      Carryover Assignment                                                                                      Max Daily Exercises 3/11/21  3/12  3/15  3/17 3/18   Floor to Ceiling  W07 with 18X flicks  S16 with 77Z flicks K60 with 00C flicks  X38 with 71P flicks K25 with 33Y flicks   Side to Side   P74 with 04V flicks  K15 with 47O flicks  X48 with 32X flicks  M06 with 40T flicks P93 with 12Z flicks   Forward Step and Reach  x10 R & L  x10 R & L  x10 R & L  x10 R & L x10 R & L   Sideways Step and Reach   x10 R & L  x10 R & L  x10 R & L  x10 R & L x10 R & L   Backwards Step and Reach   x10 R & L  x10 R & L x10 R & L  x10 R & L x10 R & L   Forwards Rock and Reach   x10 R & L  x10 R & L x10 R & L  x10 R & L x10 R & L   Sideways Rock and Reach  x10 R & L  x10 R & L  x10 R & L  x10 R & L x10 R & L   Functional Components            1  STS Mat table x20  mat table x12  mat table  x20   low chair x10 with CGA Low chair, 3x  Mat table, 7x   2  Coat donning/doffing     x5      3   Stairs  8" // bars x20 fwd/side ea 8" step-ups with 1 Hrs x20 fwd  8" 1 HR x20  8" step-ups with 1 UE x20 fwd 8" step-ups with 1 UE x20 fwd   4  Handwriting    clips greens and blues standing R & L    clips blacks and blues standing R & L  clips blacks and blues standing R & L   5  Turning            Hierarchies             1  Walking through a door             2   Walking in crowds            BIG walking              in rose fwd & bwd x10min without AD TM x10 min 1 UE 1 5 mph bwd walking in rose     TM 1 5 mph 1-2 UE x10 min 2# AW TM 1 5 mph 1-2 UE x10 min 2# AW

## 2021-03-19 ENCOUNTER — OFFICE VISIT (OUTPATIENT)
Dept: PHYSICAL THERAPY | Facility: CLINIC | Age: 81
End: 2021-03-19
Payer: COMMERCIAL

## 2021-03-19 DIAGNOSIS — R26.9 GAIT ABNORMALITY: ICD-10-CM

## 2021-03-19 DIAGNOSIS — G20 PARKINSON'S DISEASE (HCC): Primary | ICD-10-CM

## 2021-03-19 PROCEDURE — 97112 NEUROMUSCULAR REEDUCATION: CPT

## 2021-03-19 PROCEDURE — 97116 GAIT TRAINING THERAPY: CPT

## 2021-03-19 NOTE — PROGRESS NOTES
Daily Note     Today's date: 3/19/2021  Patient name: Jessa Boles  : 1940  MRN: [id number]  Referring provider: Kwesi Parmar PA-C  Dx:   Encounter Diagnosis     ICD-10-CM    1  Parkinson's disease ([address] )  G20    2  Gait abnormality  R26 9        Start Time: 0900  Stop Time: 1000  Total time in clinic (min): 60 minutes    Subjective: Presents to his OP PT session feeling well today; has been attempting to maintain compliance with LSVT programming at home - finds he is better able to complete MDE's on weekends when he does not have PT and has more energy  Reports no new falls  Has no new c/o fatigue, discomfort, or pain  Objective: See treatment diary below      Assessment: Aurelio Bolivar participated in his skilled PT session focused on LSVT BIG programming  Demonstrated improvements this session in self-led MDE's remembering more exercises than prior sessions  Continues to require intermittent VC for shaping emphasizing flick dexterity and full available BL UE supination  Would continue to benefit from skilled PT intervention to address deficits in balance and endurance to maximize overall functional mobility, quality of life, and decrease risk for falls  Plan: Continue per plan of care  Progress treatment as tolerated         Precautions: Parkinson's, fall risk  LSVT BIG Daily Treatment Diary      Carryover Assignment                                                                                      Max Daily Exercises 3/19/21  3/12  3/15  3/17 3/18   Floor to Ceiling  M38 with 85Q flicks  V64 with 42J flicks O48 with 59U flicks  D02 with 05T flicks M25 with 12T flicks   Side to Side   G86 with 51C flicks  J83 with 69W flicks  B92 with 95H flicks  N19 with 00U flicks S60 with 72F flicks   Forward Step and Reach  x10 R & L  x10 R & L  x10 R & L  x10 R & L x10 R & L   Sideways Step and Reach   x10 R & L  x10 R & L  x10 R & L  x10 R & L x10 R & L   Backwards Step and Reach   x10 R & L  x10 R & L x10 R & L  x10 [address] and Reach  x10 R & L  x10 R & L  x10 R & L  x10 R & L x10 R & L   Functional Components            1  STS Mat table x15, x8  Low chair - unable to perform  mat table x12  mat table  x20   low chair x10 with CGA Low chair, 3x  Mat table, 7x   2  Coat donning/doffing     x5      3  Stairs  8" // bars x20 fwd/side ea 8" step-ups with 1 Hrs x20 fwd  8" 1 HR x20  8" step-ups with 1 UE x20 fwd 8" step-ups with 1 UE x20 fwd   4  Handwriting  Clips black/blue alternating UL UE R & L standing  clips greens and blues standing R & L    clips blacks and blues standing R & L  clips blacks and blues standing R & L   5  Turning            Hierarchies             1  Walking through a door             2   Walking in crowds            BIG walking             TM unavailable:    Speed walking in hallway w/dowels for UE arm swing x400'    2# BL AW TM x10 min 1 UE 1 5 mph bwd walking in rose     TM 1 5 mph 1-2 UE x10 min 2# AW TM 1 5 mph 1-2 UE x10 min 2# AW

## 2021-03-22 ENCOUNTER — OFFICE VISIT (OUTPATIENT)
Dept: PHYSICAL THERAPY | Facility: CLINIC | Age: 81
End: 2021-03-22
Payer: COMMERCIAL

## 2021-03-22 DIAGNOSIS — G20 PARKINSON'S DISEASE (HCC): Primary | ICD-10-CM

## 2021-03-22 DIAGNOSIS — R26.9 GAIT ABNORMALITY: ICD-10-CM

## 2021-03-22 PROCEDURE — 97116 GAIT TRAINING THERAPY: CPT | Performed by: PHYSICAL THERAPIST

## 2021-03-22 PROCEDURE — 97112 NEUROMUSCULAR REEDUCATION: CPT | Performed by: PHYSICAL THERAPIST

## 2021-03-22 NOTE — PROGRESS NOTES
Daily Note     Today's date: 3/22/2021  Patient name: Maxi Ross  : 1940  MRN: [id number]  Referring provider: Faye Wilkinson PA-C  Dx:   Encounter Diagnosis     ICD-10-CM    1  Parkinson's disease ([address] )  G20    2  Gait abnormality  R26 9                   Subjective: No complaints of changes upon arrival       Objective: See treatment diary below      Assessment: Added ankle and wrist weights during MDEs today to challenge strength and balance  Pt had difficulty requiring more rest breaks due to fatigue however able to complete  Wrist weights then removed but ankle weights maintained for rest of session  Continues to require cues for posture during session, corrects however does not maintain  Plan: Continue with ankle and wrist weights during MDEs as pt able  Precautions: Parkinson's, fall risk  LSVT BIG Daily Treatment Diary      Carryover Assignment                                                                                      Max Daily Exercises 3/19/21  3/22  3/15  3/17 3/18   Floor to Ceiling  M01 with 30I flicks  G06 with 07I flicks R45 with 06T flicks  D54 with 68D flicks H91 with 26K flicks   Side to Side   N40 with 15N flicks  E79 with 27R flicks  I53 with 07W flicks  U29 with 56M flicks O60 with 83N flicks   Forward Step and Reach  x10 R & L  x10 R & L  x10 R & L  x10 R & L x10 R & L   Sideways Step and Reach   x10 R & L  x10 R & L  x10 R & L  x10 R & L x10 R & L   Backwards Step and Reach   x10 R & L  x10 R & L x10 R & L  x10 R & L x10 R & L   Forwards Rock and Reach   x10 R & L  x10 R & L x10 R & L  x10 R & L x10 R & L   Sideways Rock and Reach  x10 R & L  x10 R & L  x10 R & L  x10 R & L x10 R & L   Functional Components            1  STS Mat table x15, x8  Low chair - unable to perform  mat table x20  mat table  x20   low chair x10 with CGA Low chair, 3x  Mat table, 7x   2  Coat donning/doffing     x5      3   Stairs  8" // bars x20 fwd/side ea 8" step-ups with 1 Hrs x20 fwd ea  8" 1 HR x20  8" step-ups with 1 UE x20 fwd 8" step-ups with 1 UE x20 fwd   4  Handwriting  Clips black/blue alternating UL UE R & L standing      clips blacks and blues standing R & L  clips blacks and blues standing R & L   5  Turning            Hierarchies             1  Walking through a door             2   Walking in crowds            BIG walking             TM unavailable:    Speed walking in hallway w/dowels for UE arm swing x400'    2# BL AW TM x10 min 1 UE 1 6 mph    bwd walking in rose 4x40' bwd walking in rose     TM 1 5 mph 1-2 UE x10 min 2# AW TM 1 5 mph 1-2 UE x10 min 2# AW

## 2021-03-24 ENCOUNTER — OFFICE VISIT (OUTPATIENT)
Dept: PHYSICAL THERAPY | Facility: CLINIC | Age: 81
End: 2021-03-24
Payer: COMMERCIAL

## 2021-03-24 DIAGNOSIS — R26.9 GAIT ABNORMALITY: ICD-10-CM

## 2021-03-24 DIAGNOSIS — G20 PARKINSON'S DISEASE (HCC): Primary | ICD-10-CM

## 2021-03-24 PROCEDURE — 97112 NEUROMUSCULAR REEDUCATION: CPT

## 2021-03-24 PROCEDURE — 97116 GAIT TRAINING THERAPY: CPT

## 2021-03-24 NOTE — PROGRESS NOTES
Daily Note     Today's date: 3/24/2021  Patient name: Jessa Boles  : 1940  MRN: [id number]  Referring provider: Kwesi Parmar PA-C  Dx: No diagnosis found  Subjective: Presents to his OP PT session with no new c/o fatigue, discomfort, or pain since last visit  Objective: See treatment diary below    **3# BL ankle weights throughout session today**    Assessment: Aurelio Bolivar participated in his skilled PT session focused on LSVT-BIG programming, gait speed, and functional conditioning  He had better memory of nearly all MDE's today - able to lead mostly in order and with nearly correct form throughout  He was able to handle 3# BL AW throughout his session maintaining adequate step amplitude/foot clearance with all stepping activities and TM ambulation  Would continue to benefit from skilled PT intervention to address deficits in balance and movement amplitude to maximize overall functional mobility, quality of life, and decrease risk for falls  Plan: Continue per plan of care  Progress treatment as tolerated         Precautions: Parkinson's, fall risk  LSVT BIG Daily Treatment Diary      Carryover Assignment                                                                                      Max Daily Exercises 3/19/21  3/22  3/24  3/17 3/18   Floor to Ceiling  S31 with 76Z flicks  T67 with 61J flicks I26 with 75W flicks  A85 with 10V flicks J72 with 43M flicks   Side to Side   I06 with 38N flicks  M22 with 79Q flicks  E06 with 10Y flicks  Y62 with 94E flicks H23 with 59Z flicks   Forward Step and Reach  x10 R & L  x10 R & L  x10 R & L  x10 R & L x10 R & L   Sideways Step and Reach   x10 R & L  x10 R & L  x10 R & L  x10 R & L x10 R & L   Backwards Step and Reach   x10 R & L  x10 R & L x10 R & L  x10 R & L x10 R & L   Forwards Rock and Reach   x10 R & L  x10 R & L x10 R & L  x10 R & L x10 R & L   Sideways Rock and Reach  x10 R & L  x10 R & L  x10 R & L  x10 R & L x10 R & L   Functional Components            1  STS Mat table x15, x8  Low chair - unable to perform  mat table x20  mat table  x25   low chair x10 with CGA Low chair, 3x  Mat table, 7x   2  Coat donning/doffing          3  Stairs  8" // bars x20 fwd/side ea 8" step-ups with 1 Hrs x20 fwd ea  8" step-ups 1 HR (L)  x20 fwd ea    8" step-ups with 1 UE x20 fwd 8" step-ups with 1 UE x20 fwd   4  Handwriting  Clips black/blue alternating UL UE R & L standing    NV  clips blacks and blues standing R & L  clips blacks and blues standing R & L   5  Turning            Hierarchies             1  Walking through a door             2   Walking in crowds            BIG walking             TM unavailable:    Speed walking in hallway w/dowels for UE arm swing x400'    2# BL AW TM x10 min 1 UE 1 6 mph    bwd walking in rose 4x40' Hallway HIIT w/3# BL AW progressively faster laps timed 3x100'      TM x10 min  1 UE 1 5 mph   3# BL AW     TM 1 5 mph 1-2 UE x10 min 2# AW TM 1 5 mph 1-2 UE x10 min 2# AW

## 2021-03-25 ENCOUNTER — OFFICE VISIT (OUTPATIENT)
Dept: PHYSICAL THERAPY | Facility: CLINIC | Age: 81
End: 2021-03-25
Payer: COMMERCIAL

## 2021-03-25 DIAGNOSIS — R26.9 GAIT ABNORMALITY: ICD-10-CM

## 2021-03-25 DIAGNOSIS — G20 PARKINSON'S DISEASE (HCC): Primary | ICD-10-CM

## 2021-03-25 PROCEDURE — 97116 GAIT TRAINING THERAPY: CPT | Performed by: PHYSICAL THERAPIST

## 2021-03-25 PROCEDURE — 97112 NEUROMUSCULAR REEDUCATION: CPT | Performed by: PHYSICAL THERAPIST

## 2021-03-25 NOTE — PROGRESS NOTES
Daily Note     Today's date: 3/25/2021  Patient name: Jessa Boles  : 1940  MRN: [id number]  Referring provider: Kwesi Parmar PA-C  Dx:   Encounter Diagnosis     ICD-10-CM    1  Parkinson's disease ([address] )  G20    2  Gait abnormality  R26 9                   Subjective: Tired today  Objective: See treatment diary below    **3# BL ankle weights throughout session today**    Assessment: Progressed intensity of gait training on TM today by adding an incline and increasing speed, pt limited by fatigue and required cues to increase step length and improve posture as he fatigued but able to complete  Continues with improved ability to complete clip activity as seen by improved speeds  Still requires shaping for increased ER of BUE during MDEs  Plan: Discharge Monday  Precautions: Parkinson's, fall risk  LSVT BIG Daily Treatment Diary      Carryover Assignment                                                                                      Max Daily Exercises 3/19/21  3/22  3/24  3/25 3/18   Floor to Ceiling  V78 with 16A flicks  Q77 with 13N flicks U74 with 35C flicks  T35 with 64V flicks G24 with 29Q flicks   Side to Side   O27 with 05W flicks  X26 with 13F flicks  I31 with 01I flicks  Y06 with 74H flicks G47 with 02E flicks   Forward Step and Reach  x10 R & L  x10 R & L  x10 R & L  x10 R & L x10 R & L   Sideways Step and Reach   x10 R & L  x10 R & L  x10 R & L  x10 R & L x10 R & L   Backwards Step and Reach   x10 R & L  x10 R & L x10 R & L  x10 R & L x10 R & L   Forwards Rock and Reach   x10 R & L  x10 R & L x10 R & L  x10 R & L x10 R & L   Sideways Rock and Reach  x10 R & L  x10 R & L  x10 R & L  x10 R & L x10 R & L   Functional Components            1  STS Mat table x15, x8  Low chair - unable to perform  mat table x20  mat table  x25   mat table x20 Low chair, 3x  Mat table, 7x   2  Coat donning/doffing          3   Stairs  8" // bars x20 fwd/side ea 8" step-ups with 1 Hrs x20 fwd ea  8" step-ups 1 HR (L)  x20 fwd ea    8" step-ups with 1 UE x20 fwd 8" step-ups with 1 UE x20 fwd   4  Handwriting  Clips black/blue alternating UL UE R & L standing    NV clips blacks and blues standing R & L  clips blacks and blues standing R & L   5  Turning            Hierarchies             1  Walking through a door             2   Walking in crowds            BIG walking             TM unavailable:    Speed walking in hallway w/dowels for UE arm swing x400'    2# BL AW TM x10 min 1 UE 1 6 mph    bwd walking in rose 4x40' Hallway HIIT w/3# BL AW progressively faster laps timed 3x100'      TM x10 min  1 UE 1 5 mph   3# BL AW     TM 1 8 mph, 3# 1-2 UE x10 min 3# AW  bwd in rose  TM 1 5 mph 1-2 UE x10 min 2# AW

## 2021-03-26 ENCOUNTER — OFFICE VISIT (OUTPATIENT)
Dept: PHYSICAL THERAPY | Facility: CLINIC | Age: 81
End: 2021-03-26
Payer: COMMERCIAL

## 2021-03-26 DIAGNOSIS — R26.9 GAIT ABNORMALITY: ICD-10-CM

## 2021-03-26 DIAGNOSIS — G20 PARKINSON'S DISEASE (HCC): Primary | ICD-10-CM

## 2021-03-26 PROCEDURE — 97116 GAIT TRAINING THERAPY: CPT | Performed by: PHYSICAL THERAPIST

## 2021-03-26 PROCEDURE — 97112 NEUROMUSCULAR REEDUCATION: CPT | Performed by: PHYSICAL THERAPIST

## 2021-03-29 ENCOUNTER — OFFICE VISIT (OUTPATIENT)
Dept: PHYSICAL THERAPY | Facility: CLINIC | Age: 81
End: 2021-03-29
Payer: COMMERCIAL

## 2021-03-29 DIAGNOSIS — R26.9 GAIT ABNORMALITY: ICD-10-CM

## 2021-03-29 DIAGNOSIS — G20 PARKINSON'S DISEASE (HCC): Primary | ICD-10-CM

## 2021-03-29 PROCEDURE — 97116 GAIT TRAINING THERAPY: CPT | Performed by: PHYSICAL THERAPIST

## 2021-03-29 PROCEDURE — 97112 NEUROMUSCULAR REEDUCATION: CPT | Performed by: PHYSICAL THERAPIST

## 2021-03-29 NOTE — PROGRESS NOTES
Discharge Note     Today's date: 3/29/2021  Patient name: Jacob Herndon  : 1940  MRN: [id number]  Referring provider: Caren Siddiqi PA-C  Dx:   Encounter Diagnosis     ICD-10-CM    1  Parkinson's disease ([address] )  G20    2  Gait abnormality  R26 9                   Subjective: Confirms d/c today  Thinks he's gotten better  Wife states he doesn't freeze as much  Denies pain or any changes since last visit  Objective: See treatment diary below  Sit to Stand Assessment  5xSTS score (time): 27 sec on eval; 15 sec today     Gait Assessments  1  Timed Up and Go (TUG)              TUG Score: 23 5 sec without AD on eval; 19 sec today              TUG Manual Score: 27 5 sec without AD on eval; 23 sec today              TUG Cognitive Score: 31 25 sec without AD on eval; 29 sec today  2  6 minute walk test score: 400' with walking stick and supervision on eval; 700' today with walking stick prn independently  3  Gait speed:  59 m/s with walking stick;  73 m/s without walking stick today     Hand Function Assessment  9 Hole Peg Test Score: L hand 35 sec; R hand 45 sec on eval; L hand 31 sec; R hand 37 sec today    ABC: 79%    Assessment: Pt has made significant progress with outcomes above  Did not fully meet LTGs at this time, continues with deficits in endurance and balance, but markedly improved since eval   Pt has completed the LSVT Big program and will continue on his own at home  Will follow up with PT as needed  Plan: Discharge       Precautions: Parkinson's, fall risk  LSVT BIG Daily Treatment Diary      Carryover Assignment                                                                                      Max Daily Exercises 3/29/21  3/22  3/24  3/25 3/26   Floor to Ceiling  W31 with 24F flicks  A15 with 68X flicks R03 with 27E flicks  D43 with 23G flicks U98 with 50A flicks   Side to Side   R67 with 84Q flicks  D34 with 61J flicks  G05 with 84M flicks  B19 with 47L flicks F71 with 64G flicks Forward Step and Reach  x10 R & L  x10 R & L  x10 R & L  x10 R & L x10 R & L   Sideways Step and Reach   x10 R & L  x10 R & L  x10 R & L  x10 R & L x10 R & L   Backwards Step and Reach   x10 R & L  x10 R & L x10 R & L  x10 R & L x10 R & L   Forwards Rock and Reach   x10 R & L  x10 R & L x10 R & L  x10 R & L x10 R & L   Sideways Rock and Reach  x10 R & L  x10 R & L  x10 R & L  x10 R & L x10 R & L   Functional Components            1  STS   mat table x20  mat table  x25   mat table x20 Mat table x20   2  Coat donning/doffing          3  Stairs  8" step-ups with 1 Hrs x20 fwd ea  8" step-ups 1 HR (L)  x20 fwd ea    8" step-ups with 1 UE x20 fwd 6" & foam step-ups with 1 UE x20 fwd   4  Handwriting     NV clips blacks and blues standing R & L  coins, standing R & L hands with flicks   5  Turning            Hierarchies             1  Walking through a door             2   Walking in crowds            BIG walking              TM x10 min 1 UE 1 6 mph    bwd walking in rose 4x40' Hallway HIIT w/3# BL AW progressively faster laps timed 3x100'      TM x10 min  1 UE 1 5 mph   3# BL AW     TM 1 8 mph, 3# 1-2 UE x10 min 3# AW  bwd in rose  TM 1 5 mph 1-2 UE x10 min 3# AW  bwd in rose 2x40'

## 2021-04-06 ENCOUNTER — OFFICE VISIT (OUTPATIENT)
Dept: FAMILY MEDICINE CLINIC | Facility: CLINIC | Age: 81
End: 2021-04-06
Payer: COMMERCIAL

## 2021-04-06 VITALS
DIASTOLIC BLOOD PRESSURE: 84 MMHG | OXYGEN SATURATION: 95 % | WEIGHT: 211 LBS | BODY MASS INDEX: 31.98 KG/M2 | HEART RATE: 71 BPM | SYSTOLIC BLOOD PRESSURE: 160 MMHG | TEMPERATURE: 97.4 F | HEIGHT: 68 IN

## 2021-04-06 DIAGNOSIS — H66.90 ACUTE OTITIS MEDIA, UNSPECIFIED OTITIS MEDIA TYPE: Primary | ICD-10-CM

## 2021-04-06 PROCEDURE — 3725F SCREEN DEPRESSION PERFORMED: CPT | Performed by: FAMILY MEDICINE

## 2021-04-06 PROCEDURE — 1160F RVW MEDS BY RX/DR IN RCRD: CPT | Performed by: FAMILY MEDICINE

## 2021-04-06 PROCEDURE — 1036F TOBACCO NON-USER: CPT | Performed by: FAMILY MEDICINE

## 2021-04-06 PROCEDURE — 99213 OFFICE O/P EST LOW 20 MIN: CPT | Performed by: FAMILY MEDICINE

## 2021-04-06 RX ORDER — FLUTICASONE PROPIONATE 50 MCG
2 SPRAY, SUSPENSION (ML) NASAL DAILY
Qty: 16 G | Refills: 0 | Status: SHIPPED | OUTPATIENT
Start: 2021-04-06 | End: 2021-04-28

## 2021-04-06 RX ORDER — AMOXICILLIN 500 MG/1
500 TABLET, FILM COATED ORAL 3 TIMES DAILY
Qty: 21 TABLET | Refills: 0 | Status: SHIPPED | OUTPATIENT
Start: 2021-04-06 | End: 2021-04-13

## 2021-04-06 NOTE — PROGRESS NOTES
Assessment/Plan:    Side effect profile medication reviewed  Patient will call with any new persisting or worsening symptoms  1  Acute otitis media, unspecified otitis media type  -     amoxicillin (AMOXIL) 500 MG tablet; Take 1 tablet (500 mg total) by mouth 3 (three) times a day for 7 days  -     fluticasone (FLONASE) 50 mcg/act nasal spray; 2 sprays into each nostril daily          Subjective:      Patient ID: Callie Farley is a [de-identified] y o  male  Patient with left-sided ear pain  He recently flew back from [place] last night and developed some ear pain through the night  No fevers  No cough or dizziness  No ringing in the ears  The following portions of the patient's history were reviewed and updated as appropriate: allergies, current medications, past family history, past medical history, past social history, past surgical history, and problem list     Review of Systems   Constitutional: Negative  HENT: Positive for ear pain  Eyes: Negative  Respiratory: Negative  Cardiovascular: Negative  Gastrointestinal: Negative  Endocrine: Negative  Genitourinary: Negative  Musculoskeletal: Negative  Skin: Negative  Allergic/Immunologic: Negative  Neurological: Negative  Hematological: Negative  Psychiatric/Behavioral: Negative  Objective:      /84 (BP Location: Left arm, Patient Position: Sitting, Cuff Size: Adult)   Pulse 71   Temp (!) 97 4 °F (36 3 °C) (Temporal)   Ht 5' 8" (1 727 m)   Wt 95 7 kg (211 lb)   SpO2 95%   BMI 32 08 kg/m²          Physical Exam  Vitals signs reviewed  Constitutional:       Appearance: He is well-developed  HENT:      Head: Normocephalic and atraumatic  Right Ear: Tympanic membrane and external ear normal  Tympanic membrane is not erythematous or bulging  Left Ear: External ear normal  Tympanic membrane is not erythematous or bulging        Ears:      Comments: Left tympanic membrane is slightly erythematous  It is intact with no fluid into the canal   Canal is clear  Nose: Nose normal       Mouth/Throat:      Mouth: No oral lesions  Pharynx: No oropharyngeal exudate  Eyes:      General: No scleral icterus  Right eye: No discharge  Left eye: No discharge  Conjunctiva/sclera: Conjunctivae normal    Neck:      Musculoskeletal: Normal range of motion and neck supple  Thyroid: No thyromegaly  Cardiovascular:      Rate and Rhythm: Normal rate and regular rhythm  Heart sounds: Normal heart sounds  No murmur  No friction rub  No gallop  Pulmonary:      Effort: Pulmonary effort is normal  No respiratory distress  Breath sounds: No wheezing or rales  Chest:      Chest wall: No tenderness  Abdominal:      General: Bowel sounds are normal  There is no distension  Palpations: Abdomen is soft  There is no mass  Tenderness: There is no abdominal tenderness  There is no guarding or rebound  Musculoskeletal: Normal range of motion  General: No tenderness or deformity  Lymphadenopathy:      Cervical: No cervical adenopathy  Skin:     General: Skin is warm and dry  Coloration: Skin is not pale  Findings: No erythema or rash  Neurological:      Mental Status: He is alert and oriented to person, place, and time  Cranial Nerves: No cranial nerve deficit  Motor: No abnormal muscle tone  Coordination: Coordination normal       Deep Tendon Reflexes: Reflexes are normal and symmetric     Psychiatric:         Behavior: Behavior normal

## 2021-04-06 NOTE — PROGRESS NOTES
BMI Counseling: Body mass index is 32 08 kg/m²  The BMI is above normal  Nutrition recommendations include reducing portion sizes

## 2021-04-28 DIAGNOSIS — H66.90 ACUTE OTITIS MEDIA, UNSPECIFIED OTITIS MEDIA TYPE: ICD-10-CM

## 2021-04-28 RX ORDER — FLUTICASONE PROPIONATE 50 MCG
SPRAY, SUSPENSION (ML) NASAL
Qty: 16 ML | Refills: 2 | Status: SHIPPED | OUTPATIENT
Start: 2021-04-28

## 2021-05-18 DIAGNOSIS — G20 PARKINSON'S DISEASE (HCC): ICD-10-CM

## 2021-05-18 RX ORDER — ENTACAPONE 200 MG/1
TABLET ORAL
Qty: 450 TABLET | Refills: 1 | Status: SHIPPED | OUTPATIENT
Start: 2021-05-18 | End: 2022-01-01

## 2021-08-04 NOTE — ASSESSMENT & PLAN NOTE
BP was elevated in the office today, however repeat testing was normal   Will have him continue to follow with PCP for management

## 2021-08-04 NOTE — ASSESSMENT & PLAN NOTE
Patient continues to have some wearing off between doses despite being on the Comtan  No side effects to the Breitenhain at this point  We did discuss the option of increasing his Sinemet dose further however he is hesitant given he felt higher doses caused worsening freezing in the past   For now he will remain on his current dose of medication however if the wearing off or parkinson's symptoms worsen then may consider taking Sinemet 1 5tabs every other dose  He was encouraged to continue with the BIG program exercises

## 2021-08-04 NOTE — PATIENT INSTRUCTIONS
Patient continues to have some wearing off between doses despite being on the Comtan  No side effects to the Breitenhain at this point  We did discuss the option of increasing his Sinemet dose further however he is hesitant given he felt higher doses caused worsening freezing in the past   For now he will remain on his current dose of medication however if the wearing off or parkinson's symptoms worsen then may consider taking Sinemet 1 5tabs every other dose  He was encouraged to continue with the BIG program exercises  BP was elevated in the office today, however repeat testing was normal   Will have him continue to follow with PCP for management

## 2021-11-11 PROBLEM — I16.0 HYPERTENSIVE URGENCY: Status: ACTIVE | Noted: 2021-01-01

## 2021-11-11 PROBLEM — R07.9 CHEST PAIN: Status: ACTIVE | Noted: 2021-01-01

## 2021-11-29 PROBLEM — R13.11 ORAL PHASE DYSPHAGIA: Status: ACTIVE | Noted: 2021-01-01

## 2022-01-01 ENCOUNTER — APPOINTMENT (OUTPATIENT)
Dept: PHYSICAL THERAPY | Facility: CLINIC | Age: 82
End: 2022-01-01
Payer: COMMERCIAL

## 2022-01-01 ENCOUNTER — TELEPHONE (OUTPATIENT)
Dept: NEUROLOGY | Facility: CLINIC | Age: 82
End: 2022-01-01

## 2022-01-01 ENCOUNTER — TELEPHONE (OUTPATIENT)
Dept: FAMILY MEDICINE CLINIC | Facility: CLINIC | Age: 82
End: 2022-01-01

## 2022-01-01 ENCOUNTER — OFFICE VISIT (OUTPATIENT)
Dept: NEUROLOGY | Facility: CLINIC | Age: 82
End: 2022-01-01
Payer: COMMERCIAL

## 2022-01-01 ENCOUNTER — CONSULT (OUTPATIENT)
Dept: DERMATOLOGY | Facility: CLINIC | Age: 82
End: 2022-01-01
Payer: COMMERCIAL

## 2022-01-01 ENCOUNTER — TELEPHONE (OUTPATIENT)
Dept: DERMATOLOGY | Age: 82
End: 2022-01-01

## 2022-01-01 ENCOUNTER — OFFICE VISIT (OUTPATIENT)
Dept: PHYSICAL THERAPY | Facility: CLINIC | Age: 82
End: 2022-01-01
Payer: COMMERCIAL

## 2022-01-01 VITALS — WEIGHT: 190 LBS | BODY MASS INDEX: 27.2 KG/M2 | HEIGHT: 70 IN | TEMPERATURE: 97.8 F

## 2022-01-01 VITALS
HEART RATE: 66 BPM | SYSTOLIC BLOOD PRESSURE: 126 MMHG | WEIGHT: 192.8 LBS | TEMPERATURE: 96 F | OXYGEN SATURATION: 98 % | HEIGHT: 68 IN | BODY MASS INDEX: 29.22 KG/M2 | DIASTOLIC BLOOD PRESSURE: 66 MMHG

## 2022-01-01 DIAGNOSIS — C44.519 BASAL CELL CARCINOMA (BCC) OF THORACIC REGION OF BACK: Primary | ICD-10-CM

## 2022-01-01 DIAGNOSIS — G20 PARKINSON'S DISEASE (HCC): ICD-10-CM

## 2022-01-01 DIAGNOSIS — G20 PARKINSON'S DISEASE (HCC): Primary | ICD-10-CM

## 2022-01-01 DIAGNOSIS — C44.519 BASAL CELL CARCINOMA (BCC) OF LOWER BACK: ICD-10-CM

## 2022-01-01 PROCEDURE — 99214 OFFICE O/P EST MOD 30 MIN: CPT | Performed by: PHYSICIAN ASSISTANT

## 2022-01-01 PROCEDURE — 97112 NEUROMUSCULAR REEDUCATION: CPT

## 2022-01-01 PROCEDURE — 97530 THERAPEUTIC ACTIVITIES: CPT

## 2022-01-01 PROCEDURE — 99203 OFFICE O/P NEW LOW 30 MIN: CPT | Performed by: DERMATOLOGY

## 2022-01-01 PROCEDURE — 1036F TOBACCO NON-USER: CPT | Performed by: DERMATOLOGY

## 2022-01-01 RX ORDER — RASAGILINE 1 MG/1
1 TABLET ORAL DAILY
Qty: 90 TABLET | Refills: 3 | Status: SHIPPED | OUTPATIENT
Start: 2022-01-01

## 2022-01-06 NOTE — PROGRESS NOTES
Daily Note     Today's date: 2022  Patient name: Tresia Brittle  : 1940  MRN: [id number]  Referring provider: Jaqueline Moss PA-C  Dx:   Encounter Diagnosis     ICD-10-CM    1  Parkinson's disease ([address] )  G20                   Subjective: Patient reports he is doing well this morning  Objective: See treatment diary below      Assessment: Pt presents to physical therapy   Session focused on endurance, strength, and mobility  Patient demonstrates improved gait mechanics and activity tolerance  Progress intensity and duration of interventions in subsequent sessions as patient is safe and able  Pt will continue to benefit from skilled physical therapy to maximize functional independence, safety, confidence and QOL at home and within the community  Plan: Continue per plan of care        Goals  Updated Goals 12/15/21  STG   Patient will improve 2MWT by [address] for older adults of 40ft within 4weeks - in progress   Patient will improve 5x STS by 6s or more within 4 weeks  Patient will improvedTUG by MDC of 5 seconds or greater within 4weeks   Patient will be independent with resuming LSVT BIG exercises 2x/day within 4 weeks  LTG  Patient will improve 6MWT by [address] for patients with PD by 260ft w/AD within 8 weeks   Patient will demonstrate improved understanding and independence with use of AD within 8 weeks  Patient will improve gait speed with AD to  95m/s for his age range within 8 weeks     Plan  Patient would benefit from: skilled physical therapy  Planned therapy interventions: activity modification, balance, motor coordination training, neuromuscular re-education, patient education, postural training, strengthening, stretching, therapeutic activities, therapeutic exercise, transfer training, home exercise program, gait training and coordination  Frequency: 2x week  Duration in weeks: 8  Plan of Care beginning date: 2021  Plan of Care expiration date: 02/15/2022  Treatment plan discussed with: family and patient     Precautions PD, Falls Risk        Manuals 12/30 1/6 12/21 12/23                                   Neuro Re-Ed         LSVT BIG seated        LSVT BIG   Standing Adapted          BIG Walking  RW 100ft   200ft RW - 200', 100', several turns, environmental negotiation  RW - 200' in hallway, CGA for safety  RW - 300', 100'  throughout clinic, CGA, decreased freezing   BIG STS From chair no UE 3x10 From low mat no UE support - CS   From low mat 3x10 - no UE assist, CS  From low mat 3x10 - no UE assist CS    Sidestepping        HK marching        Step ups        Ther Ex        NuStep L4   2 min   1 5 min x4  1 min  2 min   1 min x3    13 min total   L4   2 min   2 min   1 min  1 min   1 min   1 min   1 min   1 min   1 min   1 min   1 min   1 min     x14 min   S10 A10 L4    2 min   1 min   1 min  2 min  1 min   1 min   1 min   1 min     x10 min total  S11 A11     2 min   1 min   2 min   2 min   1 min   1 min   1 min   1 min   1 min     x12 min total                                                           Ther Activity        Transfers  Stand pivot throughout session - CGA, VCs for sequencing Stand pivot x2 - CS   Stand pivot throughout session - CGA, VCs for sequencing   Stand pivot throughout session - CGA, VCs for sequencing   Hierarchy Tasks         Therapeutic Rest  herapeutic rest breaks prn secondary to patient exerting maximal effort to allow recovery to begin interventions subsequently  Concurrent symptom/vital monitoring, pt edu regarding performance and POC  Therapeutic rest breaks prn secondary to patient exerting maximal effort to allow recovery to begin interventions subsequently  Concurrent symptom/vital monitoring, pt edu regarding performance and POC  Therapeutic rest breaks prn secondary to patient exerting maximal effort to allow recovery to begin interventions subsequently  Concurrent symptom/vital monitoring, pt edu regarding performance and POC      Therapeutic rest breaks prn secondary to patient exerting maximal effort to allow recovery to begin interventions subsequently  Concurrent symptom/vital monitoring, pt edu regarding performance and POC        Gait Training                        Modalities

## 2022-01-20 NOTE — PROGRESS NOTES
Therapist called and spoke to patient's wife, who wished to self-discharge from therapy due to challenges with the weather and hip pain, and stating that the patient is performing exercise at home with stationary bike and walking and performing stairs  Educated patient's wife importance of staying active with Parkinson's Disease, but wished to discharge at this time, maybe will continue therapy when the weather is better      Sharmila Johnson PT DPT  1/20/22

## 2022-02-14 NOTE — TELEPHONE ENCOUNTER
Guadalupe Maharaj from [facility] called to check on the status of a fax they had sent over on the 10th regarding medical supplies for the patient  They had our fax number incorrect and they were given the correct fax number and stated that they will be faxing over the paperwork again today

## 2022-02-15 NOTE — TELEPHONE ENCOUNTER
Forms successfully faxed back to [facility] at the fax # of [phone]

## 2022-04-27 NOTE — PROGRESS NOTES
Patient ID: Kirill Multani is a 80 y o  male  Assessment/Plan:    Parkinson's disease [facility]  Patient with Parkinson's disease complicated by freezing of gait  He stopped both the Aricept and Comtan since his last visit on his own  He felt that he was having some GI issues with Aricept and Comtan was causing more freezing  Since being off of these medications he feels that he has had significant improvement of his freezing  He is now using a walker rather than wheel chair  He is overall very happy with his current symptoms control  His exam is stable with improvement of gait  Will not make any changes at this time  He was encouraged to remain active  He has had PT in the past and he was encouraged to start doing these exercises on his own at home  He is hopeful to get more active with the warmer weather  Subjective:    Kirill Multani is a retired  with prostate cancer on hormonal treatment, coronary artery disease s/p CABG x3 on 11/5/19, and Parkinson's disease dx Aug 2010, who presents for follow up  To review, symptoms began with right hand rest tremor and decrease in fine motor movements which progressed to include right lower extremity tremor and slowness in movements  At his last visit he had some improvement of freezing with Aricept and PT  Main concern was dry mouth causing some issues with swallowing  He declined a swallow study       INTERVAL HISTORY:  He feels that his freezing os overall better since reducing his medications and stopping his Comtan   He is now able to use a walker   He was able to go on vacation recently   He is able to perform his ADLs, no changes with this over the past 2 years   He does struggle with staying asleep all night   He is trying to stay active around the house   He does try and keep up with some exercises and does the stationary bike at times   No issues with swallowing   He feels his memory is still good  Voice is not as clear   He is no longer having hallucinations     Current medications:  Sinemet 2tabs tab 6am, 9am, noon, 3pm 6pm and an extra 1-2 tabs in the middle of the night if needed (this is not often)  Azilect 1mg daily     Prior medications:  Zonisamide 25mg 2tabs qhs - made stiffness worse, no benefit   Sinemet - higher doses worsened freezing   Melatonin - caused hallucinations   Aricept - did not feel well on this medication   Comtan with Sinemet (4-5 x) - he felt this was making the freezing worse       I personally reviewed and updated the ROS  Objective:    Blood pressure 126/66, pulse 66, temperature (!) 96 °F (35 6 °C), temperature source Temporal, height 5' 8" (1 727 m), weight 87 5 kg (192 lb 12 8 oz), SpO2 98 %  Physical Exam  Constitutional:       Appearance: Normal appearance  Eyes:      Extraocular Movements: Extraocular movements intact  Pupils: Pupils are equal, round, and reactive to light  Pulmonary:      Effort: Pulmonary effort is normal    Neurological:      Mental Status: He is alert  Deep Tendon Reflexes: Strength normal          Neurological Exam  Mental Status  Alert  Oriented only to person, place and situation  Speech: hypophonia  Language is fluent with no aphasia  Cranial Nerves  CN III, IV, VI: Extraocular movements intact bilaterally  Pupils equal round and reactive to light bilaterally  CN VII: Full and symmetric facial movement  CN VIII:  Right: Hearing is decreased  Left: Hearing is decreased  Decreased left shoulder shrug   Motor   Increased muscle tone  Strength is 5/5 throughout all four extremities  Sensory  Light touch is normal in upper and lower extremities  Reflexes  Glabellar tap present  Coordination  Right: Finger-to-nose normal  Rapid alternating movement abnormality:  Left: Finger-to-nose normal  Rapid alternating movement abnormality:  See MDS UPDRS III      Gait  Casual gait: Unable to rise from chair without using arms    Arose using his hands with mild difficulty  Ambulating with walker  Freezing on turns otherwise he did appear to be walking better   MDS UPDRS III                              4/27/22 11/29/21   Time since last dose:       Speech   2 2   Facial Expression    2   Rigidity - Neck        Rigidity - Upper Extremity (Right)   1 1   Rigidity - Upper Extremity (Left)    1 0   Rigidity - Lower Extremity (Right)   1 1   Rigidity - Lower Extremity (Left)    0 1   Finger Taps (Right)    2 2   Finger Taps (Left)    2 2   Hand Movement (Right)   2 2   Hand Movement (Left)    2 2   Pronation/Supination (Right)   2 2   Pronation/Supination (Left)    2 2   Toe Tapping (Right)  2 2   Toe Tapping (Left)  3 3   Leg Agility (Right)   2 2   Leg Agility (Left)    2 2   Arising from Chair    2 2   Gait    2 3   Freezing of Gait  1 1   Postural Stability         Posture       Global spontaneity of movement  3 3   Postural Tremor (Right) 0 0   Postural Tremor (Left) 0 0   Kinetic Tremor (Right)  0 0   Kinetic Tremor (Left)   0 0   Rest tremor amplitude RUE  1 2   Rest tremor amplitude LUE  1 0   Rest tremor amplitude RLE  0 0   Reset tremor amplitude LLE  0 0   Lip/Jaw Tremor                Motor Exam Total:            ROS:    Review of Systems   Constitutional: Negative  Negative for appetite change and fever  HENT: Negative  Negative for hearing loss, tinnitus, trouble swallowing and voice change  Eyes: Negative  Negative for photophobia and pain  Respiratory: Negative  Negative for shortness of breath  Cardiovascular: Negative  Negative for palpitations  Gastrointestinal: Negative  Negative for nausea and vomiting  Endocrine: Negative  Negative for cold intolerance  Genitourinary: Negative  Negative for dysuria, frequency and urgency  Musculoskeletal: Positive for gait problem  Negative for myalgias and neck pain  Skin: Negative  Negative for rash  Neurological: Positive for weakness   Negative for dizziness, tremors, seizures, syncope, facial asymmetry, speech difficulty, light-headedness, numbness and headaches  Hematological: Negative  Does not bruise/bleed easily  Psychiatric/Behavioral: Negative  Negative for confusion, hallucinations and sleep disturbance  All other systems reviewed and are negative

## 2022-04-27 NOTE — ASSESSMENT & PLAN NOTE
Patient with Parkinson's disease complicated by freezing of gait  He stopped both the Aricept and Comtan since his last visit on his own  He felt that he was having some GI issues with Aricept and Comtan was causing more freezing  Since being off of these medications he feels that he has had significant improvement of his freezing  He is now using a walker rather than wheel chair  He is overall very happy with his current symptoms control  His exam is stable with improvement of gait  Will not make any changes at this time  He was encouraged to remain active  He has had PT in the past and he was encouraged to start doing these exercises on his own at home  He is hopeful to get more active with the warmer weather

## 2022-04-27 NOTE — PATIENT INSTRUCTIONS
Patient with Parkinson's disease complicated by freezing of gait  He recently stopped both the Aricept and Comtan on his own given he did not feel well with these medications  Since stopping them he has had significant improvement of his freezing  He overall feels much better on just the Sinemet and Azilect combination  At this point we will not make any changes to his medication and he was encouraged to remain active

## 2022-04-28 NOTE — TELEPHONE ENCOUNTER
Telephone call to patient, In regards scheduling MOHS consult   Left voice message to patient to please return my call at there earliest convenience

## 2022-04-28 NOTE — TELEPHONE ENCOUNTER
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

## 2022-05-17 NOTE — PATIENT INSTRUCTIONS
PRE-OPERATIVE INSTRUCTIONS - MOHS    Before your scheduled surgery, there are a number of important precautions and positive steps you should take to help prepare yourself for a successful treatment and speedy recovery  Some of the steps, which are listed below, may seem unnecessary and inconvenient, but they are important  For example, when you stop smoking, you increase your ability to heal  Occasionally, there may be valid reasons, personal or medical, why you can't comply  In such cases, please call the office so we can discuss possible ways to overcome any obstacles you may be encountering  If you have any questions about the surgery, or remember additional medical information that you forgot to mention to our staff, please contact the office prior to your surgery  GENERAL INFORMATION REGARDING MOHS MICROGRAPHIC SURGERY    Mohs surgery is a specialized technique for the removal of skin cancer developed by Dr Bruno Queen Mohs over 50 years ago to improve the cure rates of skin cancer  Traditionally, skin cancers are treated by destructive methods (radiation, freezing, scraping, and burning) or excision (cutting out the tissue with standards margins and sending it to an outside laboratory for testing)  These methods all yield cure rates between 65%-94%  However, for cancers located in cosmetically sensitive areas, large tumors, or tumors unsuccessfully treated by other means, Mohs surgery offers a higher cure rate  In most cases, Mohs surgery provides you with a 99% cure rate for primary (previously untreated) basal cell cancer and a 95% cure rate for primary squamous cell cancer  In Mohs surgery, tissue is removed and processed in a way that we are able to check 100% of the margins, giving the highest cure rate for any method of treating skin cancers while providing maximal preservation of normal skin   This allows the surgeon to produce an optimal cosmetic result for the patient by maximizing the amount of tissue removed yielding as small a scar as possible    On the day of surgery, you will be given local anesthesia only (similar to what was given to you during your initial biopsy)  You will remain awake  You will verify the location of the skin cancer prior to the onset of the surgery  Once the area is numb, the tissue containing the skin cancer will be removed, taking a small safety margin  This margin is usually smaller than what would be taken with a standard excision  Once the tissue is removed, it is marked and oriented  The first layer (Stage I) will be processed in our laboratory  The wound will be treated for bleeding and a bandage will be placed to keep you comfortable while you wait an approximate 45 minutes-1 hour (for the processing of the tissue) in your room  Your Mohs surgeon will examine the pathology in the lab, checking all the margins  If any tumor remains, you will need to take a second layer of skin (Stage 2)  The area will be re-anesthetized and your Mohs surgeon will remove more skin only in the area where the tumor exists  This process will continue until all the skin cancer is removed  Unfortunately, there is no method to predict how many layers or stages will be taken  Once the tumor has been removed completely, we will discuss the best ways to close the defect  Most wounds may be closed with stitches  A larger wound may require a skin graft or a flap  In rare instances, especially for cancers around the eye or for larger cancers, we may work with another surgeon (oculoplastic, ENT, plastics) with special skills to assist with reconstruction  Medications: Please take all your normal medications the morning of your surgery  If you are a diabetic, please bring your insulin or medications with you, as well as a snack to avoid having low blood sugar during your day with us       Blood Thinners  Most people should stop all aspirins, aspirin-containing medications ([place] Anacin, Ecotrin, etc), and non-steroidal anti-inflammatory medications (Motrin, Naproxen, Advil, Midol, Aleve, etc ) for 7 days prior to your scheduled surgery and 2 days after (unless instructed otherwise after surgery)  You may take Tylenol for pain  VERY IMPORTANT: If you take aspirin because you have had a stroke, heart attack, heart disease, other condition, or your physician has prescribed you to take it, please continue your aspirin  Ask the doctor (that prescribed the medication) if prior to surgery you should stop your prescribed blood thinners, such as Coumadin/Warfarin, Plavix, or Aggrenox  NEVER stop them without your doctor's permission or knowledge  If you have had a stroke, heart attack, or have an irregular heartbeat, your doctor may want you to continue your medication  We can still do your surgery  You may have more bruising  Antibiotics  If you usually require antibiotics prior to dental work, please let the office know at least 24 hours prior to your surgery  Medical conditions that sometimes require preoperative antibiotics include artificial heart valves, heart murmurs, artificial joints, and related problems  We will give you a different medication than the dentist, so please contact us for the correct antibiotic  If you were prescribed pre-operative antibiotics by our office, please take the medication 2 hours prior to your procedure  Vitamins and Supplements  Avoid taking any supplements with Vitamin E, Fish Oil, Gingko, Ginseng, and Garlic for 2 weeks before and 2 days after your surgery  These thin your blood    Alcohol: Avoid drinking alcohol for 2 days prior to your surgery, and for 2 days afterwards (it thins the blood and causes more bruising and swelling)  Smoking: Try to STOP or reduce smoking significantly the week before your surgery, and especially the week afterwards (it greatly improves how well you heal)   Tobacco smoke deprives the blood of oxygen, which is urgently needed by the wound during the healing process  Contact Lenses: Do not wear them on the day of the surgery  Instead, wear glasses and bring your case, in case we need to remove them  Clothing: Do not wear your nicest clothing on your surgery day  We recommend wearing a button down shirt that will not disrupt your post-operative dressing when changing later that night  Bathing: On the morning of your surgery, you may bathe or shower normally  If you get your hair done on a weekly basis, remember to get your hair washed the day before surgery  You will need to keep your surgical site dry for a minimum of 48 hours  Makeup: If your surgery is on the face, please do not wear any makeup on the day of the surgery  Jewelry: Please try to avoid wearing jewelry on the day of surgery  Food: On the morning of surgery, have breakfast but limit your intake of caffeinated beverages  They are diuretic and may inconvenience you during surgery  If you are following up with another surgeon the same day as your Mohs surgery, you must receive permission to eat breakfast from that surgeon  What to bring with you on the day of your surgery:  Bring snacks - Since you could be at the office long, you may bring snacks and/or lunch with you  Some snacks and drinks are available at the office as well  Bring a sweater - Bring a sweater or jacket that buttons or zips down the front and will not disturb your wound dressing during removal   Bring something to do - You will be spending much of the day in our office  There will be 45-60 minute waiting periods  between layers/stages, and while there is a television with cable in every room, it is nice to have something to keep you occupied such as books, magazines, knitting, music, or work  Planning Ahead:  Other Appointments - It is important to realize that no matter how small the skin cancer appears to be, looks can be deceiving   Since your surgery may last the entire day, you should not schedule any other appointments that day  Special Occasions - Surgery often creates swelling and bruising  Also, the post-op dressing may be rather large and obvious  Keep this in mind as you arrange your social/work schedule  If an important event is already planned, please check with your referring physician or your Mohs surgeon to see if the surgery can be postponed  Activity Limits after Surgery - If surgery was performed on your face, we recommend that you keep your activity level to a minimum for 2-3 days (the blood pressure elevation related to exercise can lead to bleeding)  If you have stitches in an area that will be under tension or significant movement (neck, back, arms, legs), you will need to avoid heavy lifting (anything over 5 lbs) or exercise for at least 2 weeks and possibly longer  We also advise that you limit out of town travel for the first 7 days after surgery  You should also wait at least 7 days before going into a pool or the ocean  Housework - Since you will need to minimize activity after surgery, plan to do your groceries, laundry, gardening, and other heavy household chores prior to your surgery  Please make arrangements for assistance during the post-op period  If surgery is around your mouth area, you may need to eat soft foods, such as soup, milkshakes, or yogurt for 48 hours  Purchasing bandage supplies: Prior to surgery, please purchase the following items to care for your surgical wound properly  Cotton swabs (Q-tips)  Vaseline or Aquaphor  Telfa pads (or any non-stick dressing)  Paper tape or Hypafix tape  Gauze pads (3x3)      We will provide you with some bandage supplies after surgery to get you started  Transportation: It is often reassuring and comforting to have a  drive you to and from the surgery  He or she is welcome to wait in the office during the surgery   Please note that for safety reasons, only the patient is allowed in the procedure room during surgery  Thank you for your cooperation  Rescheduling: If you need to reschedule your surgery, please notify the office as soon as possible

## 2022-05-17 NOTE — PROGRESS NOTES
[facility] Note     Patient Name: Nancy Solares  Encounter Date: 05/17/2022      Have you been cared for by a [facility] Dermatologist in the last 3 years and, if so, which one? No    · Have you traveled outside of the [address] [place] [address] in the past 3 months or outside of the [facility] area in the last 2 weeks? No     May we call your Preferred Phone number to discuss your specific medical information? Yes     May we leave a detailed message that includes your specific medical information? Yes      Today's Chief Concerns:   Concern #1:  MOHS consul BCC x 2 back       Past Medical History:  Have you personally ever had or currently have any of the following? · Skin cancer (such as Melanoma, Basal Cell Carcinoma, Squamous Cell Carcinoma? (If Yes, please provide more detail)- No  · Eczema: No  · Psoriasis: No  · HIV/AIDS: No  · Hepatitis B or C: No  · Tuberculosis: No  · Systemic Immunosuppression such as Diabetes, Biologic or Immunotherapy, Chemotherapy, Organ Transplantation, Bone Marrow Transplantation (If YES, please provide more detail): No  · Radiation Treatment (If YES, please provide more detail): No  · Any other major medical conditions/concerns? (If Yes, which types)- No    Social History:     What is/was your primary occupation? Retired      What are your hobbies/past-times? n/a    Family History:  Have any of your "first degree relatives" (parent, brother, sister, or child) had any of the following       · Skin cancer such as Melanoma or Merkel Cell Carcinoma or Pancreatic Cancer? No  · Eczema, Asthma, Hay Fever or Seasonal Allergies: No  · Psoriasis or Psoriatic Arthritis: No  · Do any other medical conditions seem to run in your family? If Yes, what condition and which relatives?   No    Current Medications:       Current Outpatient Medications:     Abiraterone Acetate 500 MG, Take by mouth 2 (two) times a day, Disp: , Rfl:     aspirin (ECOTRIN LOW STRENGTH) 81 mg EC tablet, Take 1 capsule by mouth daily , Disp: , Rfl:     calcium-vitamin D (OSCAL 500 + D) 500 mg-200 units per tablet, Take 1 tablet by mouth, Disp: , Rfl:     carbidopa-levodopa (SINEMET)  mg per tablet, TAKE 2 AT 6AM, 9AM, 3PM AND 2 & 1/2 TABS AT 12 NOON AND 6PM (Patient taking differently: TAKE 2 AT 6AM, 9AM, 12 Noon , and 3PM), Disp: 990 tablet, Rfl: 4    Cholecalciferol (VITAMIN D3 PO), Take by mouth, Disp: , Rfl:     leuprolide (LUPRON DEPOT 3 MONTH KIT) 22 5 mg injection, Inject 11 25 mg into a muscle every 6 (six) months , Disp: , Rfl:     metoprolol tartrate (LOPRESSOR) 25 mg tablet, Take 25 mg by mouth every 12 (twelve) hours  , Disp: , Rfl:     Misc  Devices (TRANSPORT CHAIR) MISC, by Does not apply route daily, Disp: 1 each, Rfl: 0    nitroglycerin (NITROSTAT) 0 4 mg SL tablet, Place 0 4 mg under the tongue as needed , Disp: , Rfl:     predniSONE 2 5 mg tablet, Take 2 5 mg by mouth, Disp: , Rfl:     PREVIDENT 5000 BOOSTER PLUS 1 1 % PSTE, USE AS DIRECTED, Disp: , Rfl: 4    rasagiline (AZILECT) 1 MG, Take 1 tablet (1 mg total) by mouth daily, Disp: 90 tablet, Rfl: 3    rosuvastatin (CRESTOR) 40 MG tablet, , Disp: , Rfl:     fluticasone (FLONASE) 50 mcg/act nasal spray, SPRAY 2 SPRAYS INTO EACH NOSTRIL EVERY DAY (Patient not taking: No sig reported), Disp: 16 mL, Rfl: 2    Misc  Devices (TRANSPORT CHAIR) MISC, by Does not apply route daily (Patient not taking: No sig reported), Disp: 1 each, Rfl: 0    Misc  Devices (TRANSPORT CHAIR) MISC, by Does not apply route daily (Patient not taking: No sig reported), Disp: 1 each, Rfl: 0    mupirocin (BACTROBAN) 2 % ointment, Apply topically 3 (three) times a day (Patient not taking: Reported on 10/21/2021), Disp: 22 g, Rfl: 0      Review of Systems:  Have you recently had or currently have any of the following? If YES, what are you doing for the problem?     · Fever, chills or unintended weight loss: No  · Sudden loss or change in your vision: No  · Nausea, vomiting or blood in your stool: No  · Painful or swollen joints: No  · Wheezing or cough: No  · Changing mole or non-healing wound: No  · Nosebleeds: No  · Excessive sweating: No  · Easy or prolonged bleeding? No  · Over the last 2 weeks, how often have you been bothered by the following problems? · Taking little interest or pleasure in doing things: 1 - Not at All  · Feeling down, depressed, or hopeless: 1 - Not at All  · Rapid heartbeat with epinephrine:  No    · FEMALES ONLY:    · Are you pregnant or planning to become pregnant? N/A  · Are you currently or planning to be nursing or breast feeding? N/A    · Any known allergies? Allergies   Allergen Reactions    Pollen Extract          Physical Exam:     Was a chaperone (Derm Clinical Assistant) present throughout the entire Physical Exam? Yes     Did the Dermatology Team specifically  the patient on the importance of a Full Skin Exam to be sure that nothing is missed clinically?  Yes}  o Did the patient ultimately request or accept a Full Skin Exam?  NO  o Did the patient specifically refuse to have the areas "under-the-bra" examined by the Dermatologist? No  o Did the patient specifically refuse to have the areas "under-the-underwear" examined by the Dermatologist? No    CONSTITUTIONAL:   Vitals:    05/17/22 1406   Temp: 97 8 °F (36 6 °C)   TempSrc: Temporal   Weight: 86 2 kg (190 lb)   Height: 5' 10" (1 778 m)       PSYCH: Normal mood and affect  EYES: Normal conjunctiva  ENT: Normal lips and oral mucosa  CARDIOVASCULAR: No edema  RESPIRATORY: Normal respirations  HEME/LYMPH/IMMUNO:  No regional lymphadenopathy except as noted below in "ASSESSMENT AND PLAN BY DIAGNOSIS"    SKIN:  FULL ORGAN SYSTEM EXAM   Hair, Scalp, Ears, Face Normal except as noted below in Assessment   Neck, Cervical Chain Nodes    Right Arm/Hand/Fingers    Left Arm/Hand/Fingers    Chest/Breasts/Axillae    Abdomen, Umbilicus Normal except as noted below in Assessment   Back/Spine Normal except as noted below in Assessment   Groin/Genitalia/Buttocks    Right Leg, Foot, Toes    Left Leg, Foot, Toes         1  MOHS CONSULTATION    Referring Physician:Ranjeet Cantor  Biopsy Location: Superior thoracic spine with a 3 0 cm x 1 9 cm pink scar   Resulting Laboratory:   Case number: [id number]   Biopsy diagnosis: Basal Cell Carcinoma, Infiltrating type   Patient on blood thinners: Yes, ASA            Reviewed directly with patient treatment options, specifically Mohs  Discussed possible surgical complications and alternatives  All questions were answered in full  Assessment and Plan:   Repair will require coordination with Plastic Surgery or Oculofacial Plastic Surgery: No     2  MOHS CONSULTATION    Referring Physician:Ranjeet Cantor   Biopsy Location: Left superior medial lower back with a 2 2 cm x 1 8 cm pink scar   Resulting Laboratory:   Case number: [id number]   Biopsy diagnosis: Basal Cell Carcinoma, superficial and nodular   Patient on blood thinners: Yes, ASA          Reviewed directly with patient treatment options, specifically Mohs  Discussed possible surgical complications and alternatives  All questions were answered in full  Assessment and Plan:   Repair will require coordination with Plastic Surgery or Oculofacial Plastic Surgery: No     Pre- operative Mohs Telephone Scheduling Note    Do you have a pacemaker or defibrillator? no    Do you take antibiotics before skin or dental procedures? no  If yes, will likely require pre-operative antibiotics  Ask  the patient why they take the antibiotics (usually because of joint replacement)  Do you have a history of a joint replacements within the past 2 years? no   If yes, will likely require pre-operative antibiotics  Ask if orthopaedic surgeon has prescribed pre-operative antibiotics to take before procedures/dental work?     Do you take any OTC medications that thin your blood (Aspirin, Aleve, Ibuprofen) or supplements that thin your blood (fish oil, garlic, vitamin E, Ginko Biloba)? yes: ASA    Do you take any prescribed medications that thin your blood (Coumadin, Plavix, Xarelto, Eliquis or another prescribed blood thinner)? no    Do you have an allergy to lidocaine or epinephrine? no    Do you have an allergy to shellfish? no    Do you smoke? no      If yes,  patient to try and stop 2 days before surgery and 7 days after the surgery  Minimizing smoking as much as possible during this time will improve healing and the cosmetic result after surgery  Date scheduled: Patient prefer to wait for the 0 [facility] with other provider (Oculoplastics, Plastics, ENT) required? no   IF YES, PLEASE FORWARD TO APPROPRIATE PERSONNEL TO HELP COORDINATE  Are there remaining tumors to be scheduled? no    >2cm BCCs on the back presenting for mohs consultation  Discussed treatment options in detail  Given size, both qualify for mohs and will plan on scheduling in the future when patient is able to have the procedure done  Discussed the risk of growth and spread when treatment is delayed and patient will have the procedure done at some point this summer      Scribe Attestation    I,:  Macey Stewart MA am acting as a scribe while in the presence of the attending physician :       I,:  Elba Sanchez MD personally performed the services described in this documentation    as scribed in my presence :

## 2022-06-07 NOTE — TELEPHONE ENCOUNTER
pt's wife called to make you aware that pt is in [facility] with with a kidney stone and UTI    she states that they will not allow him to take any pills because he choked one time,  they are now giving him carb/levo that dissolves but not getting rasagiline  i made her aware that the dr's at the hospital would have to manage his medications at this time      FYI to above

## 2022-06-07 NOTE — TELEPHONE ENCOUNTER
Noted  Happy to hear that he is able to restart his Sinemet, will allow the inpatient physicians to manage as indicated at this time  Thanks!

## 2022-06-09 ENCOUNTER — TELEPHONE (OUTPATIENT)
Dept: NEUROLOGY | Facility: CLINIC | Age: 82
End: 2022-06-09

## 2022-08-19 NOTE — PROGRESS NOTES
----- Message from Navin Shepard sent at 8/19/2022  9:03 AM CDT -----  Type: Needs Medical Advice  Who Called: Pt   Symptoms (please be specific):   How long has patient had these symptoms:    Pharmacy name and phone #:    Best Call Back Number: [phone]  Additional Information: Pt requesting a call back concerning if her referral was sent to the office.       Daily Note     Today's date: 3/12/2021  Patient name: Alpa Hoffman  : 1940  MRN: [id number]  Referring provider: Katerina Valderrama PA-C  Dx:   Encounter Diagnosis     ICD-10-CM    1  Parkinson's disease ([address] )  G20    2  Gait abnormality  R26 9                   Subjective: Continues with no knee pain  With fatigue today  Objective: See treatment diary below      Assessment: Improved tolerance for clips today as seen by less time to complete and no assistance required by PT  Initiated gait training on treadmill today with good tolerance as seen by ability to complete without significant increase in SOB however had difficulty with foot clearance during swing, with cues corrected however had difficulty maintaining at this time, will continue  Plan: Progress update NV  Precautions: Parkinson's, fall risk  LSVT BIG Daily Treatment Diary      Carryover Assignment                                                                                      Max Daily Exercises 3/11/21  3/12  3/5  3/8  3/10   Floor to Ceiling  F77 with 91H flicks  J68 with 30M flicks M92 with 52B holds  x10 with 10s holds  Y96 with 10 flicks   Side to Side   I25 with 77N flicks  A51 with 76X flicks  O08 with 52B holds  x10 with 10s holds  H24 with 10 flicks   Forward Step and Reach  x10 R & L  x10 R & L  x10 R & L  x10 R & L   x10 R & L   Sideways Step and Reach   x10 R & L  x10 R & L  x10 R & L  x10 R & L   x10 R & L   Backwards Step and Reach   x10 R & L  x10 R & L x10 R & L  x10 R & L   x10 R & L   Forwards Rock and Reach   x10 R & L  x10 R & L x10 R & L  x10 R & L   x10 R & L   Sideways Rock and Reach  x10 R & L  x10 R & L  x10 R & L  x10 R & L   x10 R & L   Functional Components             1  STS Mat table x20  mat table x12  mat table  x10 CSA   mat table x20 distance supervision   mat table x20 distance supervision   2  Coat donning/doffing     x5       3   Stairs  8" // bars x20 fwd/side ea 8" step-ups with 1 Hrs x20 fwd  Trainers ascending/descending, UL UE support x8 laps   6" step-ups with B Hrs x20 fwd   6" step-ups with B Hrs x20 fwd   4  Handwriting    clips greens and blues standing R & L    clipes greens and blues standing R & L     5  Turning     STS turns w/HKM x5 ea   STS from chair to chair x10   STS with 360 turns x5   Hierarchies             1  Walking through a door             2   Walking in crowds             BIG walking               in rose fwd & bwd x10min without AD TM x10 min 1 UE 1 5 mph  Hallway:  Fwd: 2x40' no wrist weights   1x40' trial 2 5# BL wrist weights    Side: x30' ea      // bars bwd and side 2 laps ea; hallway fwd 4x40'  in rose  bwd 2x40'  Side x40' ea  Fwd 6x40'  HKM 2x40'